# Patient Record
Sex: FEMALE | Race: OTHER | HISPANIC OR LATINO | URBAN - METROPOLITAN AREA
[De-identification: names, ages, dates, MRNs, and addresses within clinical notes are randomized per-mention and may not be internally consistent; named-entity substitution may affect disease eponyms.]

---

## 2018-02-12 ENCOUNTER — EMERGENCY (EMERGENCY)
Facility: HOSPITAL | Age: 38
LOS: 1 days | Discharge: HOME | End: 2018-02-12
Attending: EMERGENCY MEDICINE | Admitting: EMERGENCY MEDICINE
Payer: COMMERCIAL

## 2018-02-12 VITALS
RESPIRATION RATE: 18 BRPM | HEART RATE: 80 BPM | TEMPERATURE: 98 F | SYSTOLIC BLOOD PRESSURE: 117 MMHG | HEIGHT: 62 IN | WEIGHT: 126.1 LBS | OXYGEN SATURATION: 98 % | DIASTOLIC BLOOD PRESSURE: 71 MMHG

## 2018-02-12 PROCEDURE — 99283 EMERGENCY DEPT VISIT LOW MDM: CPT

## 2018-02-12 NOTE — ED STATDOCS - OBJECTIVE STATEMENT
37 year old female presenting to the ED complaining of left side chest pain with coughing, nasal congestion, and numbness and tingling down her left upper extremity x 1 week.  Pt states that she has a productive cough and HA, but denies having any fever. She states that she did not receive a flu vaccination this year. Pt states that she is a never smoker, and denies prior use of inhalers. No further complaints at this time.  PMD: Dr. Benietz 37 year old female presenting to the ED complaining of cough x 1 week: occasionally productive of phlegm.  Assoc with  left side chest pain with coughing, nasal congestion, headache, body aches, and numbness and tingling down her left upper extremity.  Denies having any fever. She states that she did not receive a flu vaccination this year. Pt states that she is a never smoker, and denies prior use of inhalers. No further complaints at this time.  PMD: Dr. Benitez

## 2018-02-12 NOTE — ED STATDOCS - ENMT, MLM
TM clear bilaterally. Moist mucous membranes. Normal oropharynx. Dry cough noted. Hoarse voice. Non-tender cervical adenopathy. TM clear bilaterally. Moist mucous membranes. Normal oropharynx. Hoarse voice. Non-tender cervical adenopathy.

## 2018-02-12 NOTE — ED ADULT TRIAGE NOTE - CHIEF COMPLAINT QUOTE
pt c/o cough , congestion and tingling now to arm , s/s started 1 week ago. pt denies fevers, pt reports pain with deep breath

## 2018-02-12 NOTE — ED STATDOCS - RESPIRATORY, MLM
breath sounds clear and equal bilaterally. No rales, no wheezes. breath sounds clear and equal bilaterally. No rales, no wheezes.  Dry cough noted.

## 2018-02-12 NOTE — ED STATDOCS - PLAN OF CARE
Tylenol extra strength 2 tablets every 4 hours or Ibuprofen (motrin or advil) 3 tablets (total 600mg) every 6 hours for aches, pains, fevers or chills.  Tessalon as prescribed OR Dimetapp DM 20ml bedfore bedtime for cough/ congestion relief. Keep well hydrated: drink lots of fluids, especially vitamin C (orange juice), tea with honey & lemon or chicken broth.  Return immediately to the ER for re-examination if your symptoms are worsening. Otherwise, follow-up with your doctor in 2-3 days for re-evaluation.

## 2018-02-12 NOTE — ED ADULT NURSE NOTE - OBJECTIVE STATEMENT
Patient arrived to ED today with c/o cough, pain to her left chest when she coughs, congestion, and at times she feels like her left arm is tingling for the past day. Patient arrived to ED today with c/o cough, pain to her left chest when she coughs, headache, congestion, and at times she feels like her left arm is tingling for the past day. Patient arrived to ED today with c/o cough, pain to her left chest when she coughs, headache, body aches, congestion, and at times she feels like her left arm is tingling for the past day.

## 2018-02-12 NOTE — ED STATDOCS - CARE PLAN
Principal Discharge DX:	Influenza-like illness  Assessment and plan of treatment:	Tylenol extra strength 2 tablets every 4 hours or Ibuprofen (motrin or advil) 3 tablets (total 600mg) every 6 hours for aches, pains, fevers or chills.  Tessalon as prescribed OR Dimetapp DM 20ml bedfore bedtime for cough/ congestion relief. Keep well hydrated: drink lots of fluids, especially vitamin C (orange juice), tea with honey & lemon or chicken broth.  Return immediately to the ER for re-examination if your symptoms are worsening. Otherwise, follow-up with your doctor in 2-3 days for re-evaluation.

## 2018-07-14 ENCOUNTER — EMERGENCY (EMERGENCY)
Facility: HOSPITAL | Age: 38
LOS: 1 days | Discharge: HOME | End: 2018-07-14
Attending: EMERGENCY MEDICINE
Payer: COMMERCIAL

## 2018-07-14 VITALS
OXYGEN SATURATION: 99 % | DIASTOLIC BLOOD PRESSURE: 81 MMHG | HEIGHT: 62 IN | TEMPERATURE: 99 F | HEART RATE: 72 BPM | RESPIRATION RATE: 20 BRPM | WEIGHT: 125 LBS | SYSTOLIC BLOOD PRESSURE: 122 MMHG

## 2018-07-14 PROCEDURE — 99283 EMERGENCY DEPT VISIT LOW MDM: CPT

## 2018-07-14 RX ORDER — METHOCARBAMOL 500 MG/1
1 TABLET, FILM COATED ORAL
Qty: 15 | Refills: 0 | OUTPATIENT
Start: 2018-07-14 | End: 2018-07-18

## 2018-07-14 RX ORDER — IBUPROFEN 200 MG
600 TABLET ORAL ONCE
Qty: 0 | Refills: 0 | Status: COMPLETED | OUTPATIENT
Start: 2018-07-14 | End: 2018-07-14

## 2018-07-14 RX ORDER — IBUPROFEN 200 MG
1 TABLET ORAL
Qty: 15 | Refills: 0 | OUTPATIENT
Start: 2018-07-14 | End: 2018-07-18

## 2018-07-14 RX ORDER — METHOCARBAMOL 500 MG/1
1500 TABLET, FILM COATED ORAL ONCE
Qty: 0 | Refills: 0 | Status: COMPLETED | OUTPATIENT
Start: 2018-07-14 | End: 2018-07-14

## 2018-07-14 RX ADMIN — METHOCARBAMOL 1500 MILLIGRAM(S): 500 TABLET, FILM COATED ORAL at 12:10

## 2018-07-14 RX ADMIN — Medication 600 MILLIGRAM(S): at 12:10

## 2018-07-14 NOTE — ED PEDIATRIC TRIAGE NOTE - CHIEF COMPLAINT QUOTE
Patient is awake and oriented times 3, arrives ambulatory from families home "I think the dog scratched his head with his teeth", per patients father the patients and dogs vccines are up to date
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
## 2018-07-14 NOTE — ED STATDOCS - PHYSICAL EXAMINATION
Musculoskeletal:  No midline tenderness of C-T-LS spine to palpation, pain and spasm to bilateral trapezius muscles,  left sided lumbar paraspinal muscle tenderness, ambulating without difficulty.
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
## 2018-10-20 ENCOUNTER — EMERGENCY (EMERGENCY)
Facility: HOSPITAL | Age: 38
LOS: 1 days | Discharge: HOME | End: 2018-10-20
Attending: EMERGENCY MEDICINE
Payer: MEDICAID

## 2018-10-20 VITALS
HEART RATE: 89 BPM | OXYGEN SATURATION: 100 % | SYSTOLIC BLOOD PRESSURE: 144 MMHG | TEMPERATURE: 98 F | DIASTOLIC BLOOD PRESSURE: 89 MMHG | WEIGHT: 130.07 LBS | RESPIRATION RATE: 89 BRPM

## 2018-10-20 VITALS
HEART RATE: 70 BPM | DIASTOLIC BLOOD PRESSURE: 75 MMHG | TEMPERATURE: 99 F | SYSTOLIC BLOOD PRESSURE: 110 MMHG | OXYGEN SATURATION: 97 % | RESPIRATION RATE: 20 BRPM

## 2018-10-20 LAB
ALBUMIN SERPL ELPH-MCNC: 4.2 G/DL — SIGNIFICANT CHANGE UP (ref 3.3–5)
ALP SERPL-CCNC: 40 U/L — SIGNIFICANT CHANGE UP (ref 40–120)
ALT FLD-CCNC: 24 U/L — SIGNIFICANT CHANGE UP (ref 12–78)
ANION GAP SERPL CALC-SCNC: 11 MMOL/L — SIGNIFICANT CHANGE UP (ref 5–17)
APTT BLD: 28.1 SEC — SIGNIFICANT CHANGE UP (ref 27.5–37.4)
AST SERPL-CCNC: 18 U/L — SIGNIFICANT CHANGE UP (ref 15–37)
BASOPHILS # BLD AUTO: 0.03 K/UL — SIGNIFICANT CHANGE UP (ref 0–0.2)
BASOPHILS NFR BLD AUTO: 0.4 % — SIGNIFICANT CHANGE UP (ref 0–2)
BILIRUB SERPL-MCNC: 0.4 MG/DL — SIGNIFICANT CHANGE UP (ref 0.2–1.2)
BUN SERPL-MCNC: 13 MG/DL — SIGNIFICANT CHANGE UP (ref 7–23)
CALCIUM SERPL-MCNC: 8.8 MG/DL — SIGNIFICANT CHANGE UP (ref 8.5–10.1)
CHLORIDE SERPL-SCNC: 108 MMOL/L — SIGNIFICANT CHANGE UP (ref 96–108)
CK MB CFR SERPL CALC: <1 NG/ML — SIGNIFICANT CHANGE UP (ref 0–3.6)
CO2 SERPL-SCNC: 21 MMOL/L — LOW (ref 22–31)
CREAT SERPL-MCNC: 0.69 MG/DL — SIGNIFICANT CHANGE UP (ref 0.5–1.3)
EOSINOPHIL # BLD AUTO: 0.08 K/UL — SIGNIFICANT CHANGE UP (ref 0–0.5)
EOSINOPHIL NFR BLD AUTO: 1 % — SIGNIFICANT CHANGE UP (ref 0–6)
GLUCOSE SERPL-MCNC: 70 MG/DL — SIGNIFICANT CHANGE UP (ref 70–99)
HCG SERPL-ACNC: <1 MIU/ML — SIGNIFICANT CHANGE UP
HCT VFR BLD CALC: 40.7 % — SIGNIFICANT CHANGE UP (ref 34.5–45)
HGB BLD-MCNC: 14.2 G/DL — SIGNIFICANT CHANGE UP (ref 11.5–15.5)
IMM GRANULOCYTES NFR BLD AUTO: 0.2 % — SIGNIFICANT CHANGE UP (ref 0–1.5)
INR BLD: 1.01 RATIO — SIGNIFICANT CHANGE UP (ref 0.88–1.16)
LIDOCAIN IGE QN: 130 U/L — SIGNIFICANT CHANGE UP (ref 73–393)
LYMPHOCYTES # BLD AUTO: 2.4 K/UL — SIGNIFICANT CHANGE UP (ref 1–3.3)
LYMPHOCYTES # BLD AUTO: 28.8 % — SIGNIFICANT CHANGE UP (ref 13–44)
MAGNESIUM SERPL-MCNC: 2.1 MG/DL — SIGNIFICANT CHANGE UP (ref 1.6–2.6)
MCHC RBC-ENTMCNC: 30.7 PG — SIGNIFICANT CHANGE UP (ref 27–34)
MCHC RBC-ENTMCNC: 34.9 GM/DL — SIGNIFICANT CHANGE UP (ref 32–36)
MCV RBC AUTO: 88.1 FL — SIGNIFICANT CHANGE UP (ref 80–100)
MONOCYTES # BLD AUTO: 0.56 K/UL — SIGNIFICANT CHANGE UP (ref 0–0.9)
MONOCYTES NFR BLD AUTO: 6.7 % — SIGNIFICANT CHANGE UP (ref 2–14)
NEUTROPHILS # BLD AUTO: 5.24 K/UL — SIGNIFICANT CHANGE UP (ref 1.8–7.4)
NEUTROPHILS NFR BLD AUTO: 62.9 % — SIGNIFICANT CHANGE UP (ref 43–77)
PLATELET # BLD AUTO: 343 K/UL — SIGNIFICANT CHANGE UP (ref 150–400)
POTASSIUM SERPL-MCNC: 3.8 MMOL/L — SIGNIFICANT CHANGE UP (ref 3.5–5.3)
POTASSIUM SERPL-SCNC: 3.8 MMOL/L — SIGNIFICANT CHANGE UP (ref 3.5–5.3)
PROT SERPL-MCNC: 8.6 G/DL — HIGH (ref 6–8.3)
PROTHROM AB SERPL-ACNC: 11 SEC — SIGNIFICANT CHANGE UP (ref 9.8–12.7)
RBC # BLD: 4.62 M/UL — SIGNIFICANT CHANGE UP (ref 3.8–5.2)
RBC # FLD: 12 % — SIGNIFICANT CHANGE UP (ref 10.3–14.5)
SODIUM SERPL-SCNC: 140 MMOL/L — SIGNIFICANT CHANGE UP (ref 135–145)
TROPONIN I SERPL-MCNC: <.015 NG/ML — SIGNIFICANT CHANGE UP (ref 0.01–0.04)
WBC # BLD: 8.33 K/UL — SIGNIFICANT CHANGE UP (ref 3.8–10.5)
WBC # FLD AUTO: 8.33 K/UL — SIGNIFICANT CHANGE UP (ref 3.8–10.5)

## 2018-10-20 PROCEDURE — 93010 ELECTROCARDIOGRAM REPORT: CPT

## 2018-10-20 PROCEDURE — 83735 ASSAY OF MAGNESIUM: CPT

## 2018-10-20 PROCEDURE — 83690 ASSAY OF LIPASE: CPT

## 2018-10-20 PROCEDURE — 93005 ELECTROCARDIOGRAM TRACING: CPT

## 2018-10-20 PROCEDURE — 71045 X-RAY EXAM CHEST 1 VIEW: CPT

## 2018-10-20 PROCEDURE — 36415 COLL VENOUS BLD VENIPUNCTURE: CPT

## 2018-10-20 PROCEDURE — 99291 CRITICAL CARE FIRST HOUR: CPT

## 2018-10-20 PROCEDURE — 73030 X-RAY EXAM OF SHOULDER: CPT | Mod: 26,LT

## 2018-10-20 PROCEDURE — 85027 COMPLETE CBC AUTOMATED: CPT

## 2018-10-20 PROCEDURE — 71045 X-RAY EXAM CHEST 1 VIEW: CPT | Mod: 26

## 2018-10-20 PROCEDURE — 73030 X-RAY EXAM OF SHOULDER: CPT

## 2018-10-20 PROCEDURE — 82553 CREATINE MB FRACTION: CPT

## 2018-10-20 PROCEDURE — 99284 EMERGENCY DEPT VISIT MOD MDM: CPT

## 2018-10-20 PROCEDURE — 70450 CT HEAD/BRAIN W/O DYE: CPT | Mod: 26

## 2018-10-20 PROCEDURE — 84484 ASSAY OF TROPONIN QUANT: CPT

## 2018-10-20 PROCEDURE — 82962 GLUCOSE BLOOD TEST: CPT

## 2018-10-20 PROCEDURE — 85610 PROTHROMBIN TIME: CPT

## 2018-10-20 PROCEDURE — 85730 THROMBOPLASTIN TIME PARTIAL: CPT

## 2018-10-20 PROCEDURE — 70450 CT HEAD/BRAIN W/O DYE: CPT

## 2018-10-20 PROCEDURE — 80053 COMPREHEN METABOLIC PANEL: CPT

## 2018-10-20 PROCEDURE — 84702 CHORIONIC GONADOTROPIN TEST: CPT
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
## 2018-10-20 NOTE — ED ADULT NURSE NOTE - OBJECTIVE STATEMENT
Assumed patient care @ 1800. Pt received sitting on stretcher and appears anxious. Pt AOx3 C/O being at work and having chest pain and left sided body numbness and pain. Patient at times lifting up left arm totally and other times saying she can't. Patient complains of total left sided numbness to arm and face. Lungs clear to ausculation, respirations even unlabored. Denies Nausea, Vomiting, Diarrhea. Skin warm, dry, color appropriate for age and race.
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
## 2018-10-20 NOTE — ED PROVIDER NOTE - OBJECTIVE STATEMENT
36 yo female no pmhx c/o left arm numbness/weakness since 4:30pm while at work, BIBEMS initially for shortness of breath/chest pain.  Continues to have chest pain.  Was noted by EMS to have BP in 200's systolic.

## 2018-10-20 NOTE — ED ADULT NURSE REASSESSMENT NOTE - NS ED NURSE REASSESS COMMENT FT1
Patient ambulated with steady gait. Dr. Angie rasheed.
Patient complaining of left shoulder pain and elbow pain and now states she can feel us touching her and she has decreased strength due to pain. Patient reports the pain prior to the numbness. Numbness, HA, and anxiety began after having an argument with her boss @ 1630.
Assumed care for pt awake laert and oriented x 3, [place]. Neuro intact. No signs of stroke noted at this time. Vss, afebrile. Pt is to be discharged. will continue to monitor

## 2018-10-20 NOTE — ED PROVIDER NOTE - CARE PLAN
Principal Discharge DX:	Left arm weakness Principal Discharge DX:	Left arm weakness  Secondary Diagnosis:	Acute pain of left shoulder

## 2020-05-14 ENCOUNTER — TRANSCRIPTION ENCOUNTER (OUTPATIENT)
Age: 40
End: 2020-05-14

## 2020-07-16 PROBLEM — M54.9 DORSALGIA, UNSPECIFIED: Chronic | Status: ACTIVE | Noted: 2018-10-20

## 2020-08-16 ENCOUNTER — TRANSCRIPTION ENCOUNTER (OUTPATIENT)
Age: 40
End: 2020-08-16

## 2020-08-28 PROBLEM — Z00.00 ENCOUNTER FOR PREVENTIVE HEALTH EXAMINATION: Status: ACTIVE | Noted: 2020-08-28

## 2020-08-31 ENCOUNTER — APPOINTMENT (OUTPATIENT)
Dept: RHEUMATOLOGY | Facility: CLINIC | Age: 40
End: 2020-08-31
Payer: MEDICAID

## 2020-08-31 ENCOUNTER — LABORATORY RESULT (OUTPATIENT)
Age: 40
End: 2020-08-31

## 2020-08-31 VITALS
RESPIRATION RATE: 17 BRPM | HEIGHT: 62.5 IN | DIASTOLIC BLOOD PRESSURE: 68 MMHG | OXYGEN SATURATION: 98 % | HEART RATE: 77 BPM | TEMPERATURE: 98.3 F | BODY MASS INDEX: 25.12 KG/M2 | WEIGHT: 140 LBS | SYSTOLIC BLOOD PRESSURE: 102 MMHG

## 2020-08-31 DIAGNOSIS — Z82.49 FAMILY HISTORY OF ISCHEMIC HEART DISEASE AND OTHER DISEASES OF THE CIRCULATORY SYSTEM: ICD-10-CM

## 2020-08-31 DIAGNOSIS — Z82.62 FAMILY HISTORY OF OSTEOPOROSIS: ICD-10-CM

## 2020-08-31 DIAGNOSIS — Z83.3 FAMILY HISTORY OF DIABETES MELLITUS: ICD-10-CM

## 2020-08-31 DIAGNOSIS — Z86.39 PERSONAL HISTORY OF OTHER ENDOCRINE, NUTRITIONAL AND METABOLIC DISEASE: ICD-10-CM

## 2020-08-31 DIAGNOSIS — Z78.9 OTHER SPECIFIED HEALTH STATUS: ICD-10-CM

## 2020-08-31 PROCEDURE — 99203 OFFICE O/P NEW LOW 30 MIN: CPT

## 2020-08-31 RX ORDER — HYDROCODONE BITARTRATE AND ACETAMINOPHEN 7.5; 3 MG/1; MG/1
TABLET ORAL
Refills: 0 | Status: ACTIVE | COMMUNITY

## 2020-08-31 NOTE — HISTORY OF PRESENT ILLNESS
[FreeTextEntry1] : 39 year old female with PMH as listed below presents today for an initial evaluation. \par \par Reports to have chronic hx of low back pain, pain/swelling to Right 5th DIP that is getting progressively worse. Pain is constant, 4/10- dull/aching, worse with activity and at the end of the day. Currently taking hydrocodone prn with improvement in symptoms. \par \par Denies pain/swelling to other joints. Denies morning stiffness. denies inflammatory back pain. denies radicular symptoms. denies enthesitis, uveitis, dactylitis, psoriasis/rashes.\par \par Recently had imaging at [facility]- results pending. \par \par denies fever, chills, weight loss, weight gain, fatigue, malaise, denies dry eye,eye pain, eye redness, vision changes, dry mouth, oral ulcers, nasal congestion, difficulty swallowing,  denies ear pain, hearing changes, denies chest pain, chest palpitations, denies sob, denies nausea, vomiting, abdominal pain, diarrhea, constipation, blood in stool, denies dysuria, blood in the urine, denies rashes, photosensitivity, hair loss, skin thickening, denies blood clots,  raynauds, denies weakness, numbness, syncope, falls, headaches, denies depression, anxiety, denies bruising easily

## 2020-08-31 NOTE — PHYSICAL EXAM
[General Appearance - Alert] : alert [General Appearance - In No Acute Distress] : in no acute distress [General Appearance - Well Nourished] : well nourished [General Appearance - Well Developed] : well developed [Sclera] : the sclera and conjunctiva were normal [PERRL With Normal Accommodation] : pupils were equal in size, round, and reactive to light [Extraocular Movements] : extraocular movements were intact [Examination Of The Oral Cavity] : the lips and gums were normal [Outer Ear] : the ears and nose were normal in appearance [Neck Appearance] : the appearance of the neck was normal [Oropharynx] : the oropharynx was normal [Jugular Venous Distention Increased] : there was no jugular-venous distention [Auscultation Breath Sounds / Voice Sounds] : lungs were clear to auscultation bilaterally [Heart Rate And Rhythm] : heart rate was normal and rhythm regular [Heart Sounds Gallop] : no gallops [Heart Sounds] : normal S1 and S2 [Heart Sounds Pericardial Friction Rub] : no pericardial rub [Murmurs] : no murmurs [Bowel Sounds] : normal bowel sounds [Abdomen Soft] : soft [Abdomen Tenderness] : non-tender [No CVA Tenderness] : no ~M costovertebral angle tenderness [No Spinal Tenderness] : no spinal tenderness [Abnormal Walk] : normal gait [Nail Clubbing] : no clubbing  or cyanosis of the fingernails [Involuntary Movements] : no involuntary movements were seen [Musculoskeletal - Swelling] : no joint swelling seen [Motor Tone] : muscle strength and tone were normal [Skin Color & Pigmentation] : normal skin color and pigmentation [Skin Turgor] : normal skin turgor [] : no rash [Skin Lesions] : no skin lesions [Sensation] : the sensory exam was normal to light touch and pinprick [Motor Exam] : the motor exam was normal [No Focal Deficits] : no focal deficits [Oriented To Time, Place, And Person] : oriented to person, place, and time [Impaired Insight] : insight and judgment were intact [Affect] : the affect was normal [Mood] : the mood was normal [FreeTextEntry1] : +R>Left Heberden nodes

## 2020-08-31 NOTE — ASSESSMENT
[FreeTextEntry1] : 39 year old female presents today for an initial evaluation. Reports to have chronic hx of low back pain, pain/swelling to Right 5th DIP that is getting progressively worse. Currently taking hydrocodone prn with improvement in symptoms. PE with Heberden nodes. \par \par Symptoms likely from degenerative disease/OA\par \par -labs as below\par -will request and review recent imaging\par -NSAIDS/Tylenol prn for pain (reviewed risk and benefits of medications)\par -OTC topical analgesics\par \par Discussed treatment plan with the patient. The patient was given the opportunity to ask questions and all questions were answered to their satisfaction.\par

## 2020-09-14 ENCOUNTER — APPOINTMENT (OUTPATIENT)
Dept: RHEUMATOLOGY | Facility: CLINIC | Age: 40
End: 2020-09-14
Payer: MEDICAID

## 2020-09-14 VITALS — TEMPERATURE: 97.8 F

## 2020-09-14 DIAGNOSIS — M79.641 PAIN IN RIGHT HAND: ICD-10-CM

## 2020-09-14 DIAGNOSIS — G89.29 PAIN IN RIGHT HAND: ICD-10-CM

## 2020-09-14 DIAGNOSIS — M54.5 LOW BACK PAIN: ICD-10-CM

## 2020-09-14 DIAGNOSIS — M25.50 PAIN IN UNSPECIFIED JOINT: ICD-10-CM

## 2020-09-14 DIAGNOSIS — G89.29 LOW BACK PAIN: ICD-10-CM

## 2020-09-14 PROCEDURE — 99213 OFFICE O/P EST LOW 20 MIN: CPT

## 2020-09-14 RX ORDER — DULOXETINE HYDROCHLORIDE 20 MG/1
20 CAPSULE, DELAYED RELEASE PELLETS ORAL
Qty: 30 | Refills: 2 | Status: ACTIVE | COMMUNITY
Start: 2020-09-14 | End: 1900-01-01

## 2020-09-14 NOTE — PHYSICAL EXAM
[General Appearance - Alert] : alert [General Appearance - In No Acute Distress] : in no acute distress [General Appearance - Well Nourished] : well nourished [General Appearance - Well Developed] : well developed [Sclera] : the sclera and conjunctiva were normal [PERRL With Normal Accommodation] : pupils were equal in size, round, and reactive to light [Extraocular Movements] : extraocular movements were intact [Outer Ear] : the ears and nose were normal in appearance [Examination Of The Oral Cavity] : the lips and gums were normal [Oropharynx] : the oropharynx was normal [Neck Appearance] : the appearance of the neck was normal [Jugular Venous Distention Increased] : there was no jugular-venous distention [Auscultation Breath Sounds / Voice Sounds] : lungs were clear to auscultation bilaterally [Heart Rate And Rhythm] : heart rate was normal and rhythm regular [Heart Sounds Gallop] : no gallops [Heart Sounds] : normal S1 and S2 [Murmurs] : no murmurs [Heart Sounds Pericardial Friction Rub] : no pericardial rub [Bowel Sounds] : normal bowel sounds [Abdomen Tenderness] : non-tender [Abdomen Soft] : soft [No CVA Tenderness] : no ~M costovertebral angle tenderness [No Spinal Tenderness] : no spinal tenderness [Abnormal Walk] : normal gait [Nail Clubbing] : no clubbing  or cyanosis of the fingernails [Involuntary Movements] : no involuntary movements were seen [Musculoskeletal - Swelling] : no joint swelling seen [Motor Tone] : muscle strength and tone were normal [Skin Color & Pigmentation] : normal skin color and pigmentation [Skin Turgor] : normal skin turgor [Skin Lesions] : no skin lesions [] : no rash [Sensation] : the sensory exam was normal to light touch and pinprick [Motor Exam] : the motor exam was normal [No Focal Deficits] : no focal deficits [Oriented To Time, Place, And Person] : oriented to person, place, and time [Impaired Insight] : insight and judgment were intact [Affect] : the affect was normal [Mood] : the mood was normal [FreeTextEntry1] : +R>Left Heberden nodes

## 2020-09-14 NOTE — HISTORY OF PRESENT ILLNESS
[FreeTextEntry1] : Pt presenting today for a f.u visit. \par Continues to have low back pain, pain/swelling to Right 5th DIP. Currently taking hydrocodone prn with some improvement in symptoms. \par Denies pain/swelling to other joints. Denies morning stiffness. denies inflammatory back pain. denies radicular symptoms. denies enthesitis, uveitis, dactylitis, psoriasis/rashes.\par Labs reviewed with pt.

## 2020-09-14 NOTE — ASSESSMENT
[FreeTextEntry1] : 39 year old female presents today for an f.u visit. Reports to have chronic hx of low back pain, pain/swelling to Right 5th DIP that is getting progressively worse. Currently taking hydrocodone prn with some improvement in symptoms. PE with Heberden nodes. Labs unremarkable. \par \par Symptoms likely from degenerative disease/OA\par \par -start duloxetine 20 mg daily (reviewed risk and benefits of medications)\par -NSAIDS/Tylenol prn for pain\par -OTC topical analgesics\par \par Discussed treatment plan with the patient. The patient was given the opportunity to ask questions and all questions were answered to their satisfaction.

## 2020-11-03 NOTE — ED PROVIDER NOTE - PMH
Back pain Assumed pt care at 1645.  Pt reports he woke up with left sided chest pain.  It came back later this afternoon.  Pt has been nauseated X a couple of days and gets hiccups when he bends over. Had diarrhea a week ago and still has some lower abdominal pain.  Abdomen flat, nondistended.

## 2021-01-05 ENCOUNTER — APPOINTMENT (OUTPATIENT)
Dept: RHEUMATOLOGY | Facility: CLINIC | Age: 41
End: 2021-01-05

## 2021-01-06 ENCOUNTER — APPOINTMENT (OUTPATIENT)
Dept: ORTHOPEDIC SURGERY | Facility: CLINIC | Age: 41
End: 2021-01-06
Payer: COMMERCIAL

## 2021-01-06 PROCEDURE — 99072 ADDL SUPL MATRL&STAF TM PHE: CPT

## 2021-01-06 PROCEDURE — 73030 X-RAY EXAM OF SHOULDER: CPT | Mod: RT

## 2021-01-06 PROCEDURE — 99203 OFFICE O/P NEW LOW 30 MIN: CPT

## 2021-01-06 RX ORDER — METHYLPREDNISOLONE 4 MG/1
4 TABLET ORAL
Qty: 1 | Refills: 0 | Status: ACTIVE | COMMUNITY
Start: 2021-01-06 | End: 1900-01-01

## 2021-01-06 RX ORDER — NAPROXEN 500 MG/1
500 TABLET ORAL
Qty: 28 | Refills: 0 | Status: ACTIVE | COMMUNITY
Start: 2021-01-06 | End: 1900-01-01

## 2021-01-06 NOTE — ADDENDUM
[FreeTextEntry1] : IYolanda ATC, assisted with the history and documentation for Dr. Byrnes on this date 01/06/2021\par

## 2021-01-06 NOTE — DISCUSSION/SUMMARY
[de-identified] : Assessment: 40-year-old female with right shoulder pain secondary to calcific tendinitis\par \par Plan: I had a long discussion with the patient today regarding the nature of their diagnosis and treatment plan. We discussed the risks and benefits of no treatment as well as nonoperative and operative treatments.  I reviewed the patient's x-rays today with her in the office which are significant for calcific tendinitis.  At this time I recommend conservative treatment of the patient's condition with modalities including rest, ice, heat, anti-inflammatory medications, activity modifications, and home stretching and strengthening exercises daily.  Prescription for naproxen as well as Medrol Dosepak were sent to the patient's pharmacy today.  I discussed with the patient the risks and benefits associated with NSAID use.  A referral for physical therapy was required today to begin working on shoulder exercises.  She deferred a cortisone injection at this time.  A note was provided today to begin light duty for the next 2 weeks at her job at the [employer].  Recommend follow-up in 8 weeks for repeat clinical evaluation.\par \par The patient verbalizes her understanding and agrees with the plan.  All questions were answered to their satisfaction.

## 2021-01-06 NOTE — PHYSICAL EXAM
[de-identified] : General:\par Awake, alert, no acute distress, Patient was cooperative and appropriate during the examination.\par \par The patient is of normal weight for height and age.\par \par Walks without an antalgic gait.\par \par Full, painless range of motion of the neck and back.\par \par Exam of the bilateral lower extremities is intact and symmetric with regards to dermatologic, vascular, and neurologic exam. Bilateral lower extremity sensation is grossly intact to light touch in the DP/SP/T/S/S nerve distributions. Intact DF/PF/EHL. BIlateral lower extremities warm and well-perfused with brisk capillary refill.\par \par \par Pulmonary:\par Regular, nonlabored breathing\par \par Abdomen:\par Soft, nontender, nondistended.\par \par Lymphatic:\par No evidence of inguinal lymphadenopathy\par \par Right shoulder Exam:\par Physical exam of the shoulder demonstrates normal skin without signs of skin changes or abnormalities. No erythema, warmth, or joint effusion appreciated.  \par \par Sensation intact light touch C5-T1\par Palpable radial pulse\par Radial/ulnar/median/axillary/musculocutaneous/AIN/PIN nerves grossly intact\par \par Range of motion:\par Forward Flexion: 170\par Abduction: 150\par External Rotation: 45\par Internal Rotation: T12\par \par Palpation:\par Not tender to palpation over the glenohumeral joint\par Exquisitely tender palpation over the rotator cuff insertion on the greater tuberosity\par Not tender to palpation over the AC joint\par Moderately tender to palpation of the biceps tendon/bicipital groove\par \par Strength testing:\par Supraspinatus: 4+/5 by pain\par Infraspinatus: 4+/5 motor by pain\par Subscapularis: 5/5\par \par Special test:\par Empty can test positive\par Cleaning impingement test positive\par Speeds test negative\par [place]'s test negative\par Lift-off test negative\par Bell-press test negative\par Cross-arm adduction test negative\par \par  [de-identified] : X-rays including 4 views of the right shoulder obtained in the office and reviewed the patient today.  There is no acute fracture or dislocation.  There is no arthritis.  There is evidence of calcific tendinitis.

## 2021-01-06 NOTE — HISTORY OF PRESENT ILLNESS
[Worsening] : worsening [6] : an average pain level of 6/10 [5] : a minimum pain level of 5/10 [10] : a maximum pain level of 10/10 [Constant] : ~He/She~ states the symptoms seem to be constant [Rest] : relieved by rest [de-identified] : Allison is a 40 year old RHD Female post  who presents with right shoulder pain. Pain is located at the glenohumeral joint.  It began on 1/4/2020, without injury or trauma. She describes her pain as stabbing, in nature, 5/10 in intensity, worse with arm movement, especially GH abduction, better with rest, and Hydrocodone. 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 She denies any bruising, or swelling, nor does she complain of any painful popping, clicking, but she does complain of numbness in her lateral arm down into her hand.  The patient denies any fevers, chills, sweats, recent illnesses, numbness, tingling, weakness, or pain elsewhere at this time.

## 2021-01-27 ENCOUNTER — APPOINTMENT (OUTPATIENT)
Dept: ORTHOPEDIC SURGERY | Facility: CLINIC | Age: 41
End: 2021-01-27

## 2021-01-28 ENCOUNTER — APPOINTMENT (OUTPATIENT)
Dept: ORTHOPEDIC SURGERY | Facility: CLINIC | Age: 41
End: 2021-01-28

## 2021-02-01 ENCOUNTER — APPOINTMENT (OUTPATIENT)
Dept: ORTHOPEDIC SURGERY | Facility: CLINIC | Age: 41
End: 2021-02-01

## 2021-02-08 ENCOUNTER — APPOINTMENT (OUTPATIENT)
Dept: ORTHOPEDIC SURGERY | Facility: CLINIC | Age: 41
End: 2021-02-08
Payer: COMMERCIAL

## 2021-02-08 PROCEDURE — 99213 OFFICE O/P EST LOW 20 MIN: CPT

## 2021-02-08 PROCEDURE — 99072 ADDL SUPL MATRL&STAF TM PHE: CPT

## 2021-02-23 ENCOUNTER — OUTPATIENT (OUTPATIENT)
Dept: OUTPATIENT SERVICES | Facility: HOSPITAL | Age: 41
LOS: 1 days | End: 2021-02-23

## 2021-02-23 ENCOUNTER — APPOINTMENT (OUTPATIENT)
Dept: ULTRASOUND IMAGING | Facility: CLINIC | Age: 41
End: 2021-02-23
Payer: COMMERCIAL

## 2021-02-23 ENCOUNTER — RESULT REVIEW (OUTPATIENT)
Age: 41
End: 2021-02-23

## 2021-02-23 DIAGNOSIS — M25.511 PAIN IN RIGHT SHOULDER: ICD-10-CM

## 2021-02-23 PROCEDURE — 20611 DRAIN/INJ JOINT/BURSA W/US: CPT | Mod: RT

## 2021-03-03 ENCOUNTER — APPOINTMENT (OUTPATIENT)
Dept: ORTHOPEDIC SURGERY | Facility: CLINIC | Age: 41
End: 2021-03-03
Payer: COMMERCIAL

## 2021-03-08 ENCOUNTER — APPOINTMENT (OUTPATIENT)
Dept: ORTHOPEDIC SURGERY | Facility: CLINIC | Age: 41
End: 2021-03-08
Payer: COMMERCIAL

## 2021-03-08 VITALS
DIASTOLIC BLOOD PRESSURE: 84 MMHG | BODY MASS INDEX: 25.12 KG/M2 | HEIGHT: 62.5 IN | HEART RATE: 69 BPM | WEIGHT: 140 LBS | SYSTOLIC BLOOD PRESSURE: 133 MMHG

## 2021-03-08 VITALS — TEMPERATURE: 97.8 F

## 2021-03-08 PROCEDURE — 99072 ADDL SUPL MATRL&STAF TM PHE: CPT

## 2021-03-08 PROCEDURE — 99213 OFFICE O/P EST LOW 20 MIN: CPT

## 2021-05-03 ENCOUNTER — APPOINTMENT (OUTPATIENT)
Dept: ORTHOPEDIC SURGERY | Facility: CLINIC | Age: 41
End: 2021-05-03

## 2021-05-20 ENCOUNTER — APPOINTMENT (OUTPATIENT)
Dept: ORTHOPEDIC SURGERY | Facility: CLINIC | Age: 41
End: 2021-05-20
Payer: COMMERCIAL

## 2021-05-20 VITALS
SYSTOLIC BLOOD PRESSURE: 137 MMHG | WEIGHT: 140 LBS | HEART RATE: 70 BPM | DIASTOLIC BLOOD PRESSURE: 81 MMHG | HEIGHT: 62.5 IN | BODY MASS INDEX: 25.12 KG/M2

## 2021-05-20 PROCEDURE — 73030 X-RAY EXAM OF SHOULDER: CPT | Mod: RT

## 2021-05-20 PROCEDURE — 99072 ADDL SUPL MATRL&STAF TM PHE: CPT

## 2021-05-20 PROCEDURE — 99214 OFFICE O/P EST MOD 30 MIN: CPT

## 2021-05-20 RX ORDER — MELOXICAM 15 MG/1
15 TABLET ORAL
Qty: 21 | Refills: 0 | Status: ACTIVE | COMMUNITY
Start: 2021-05-20 | End: 1900-01-01

## 2021-07-12 ENCOUNTER — APPOINTMENT (OUTPATIENT)
Dept: ORTHOPEDIC SURGERY | Facility: CLINIC | Age: 41
End: 2021-07-12

## 2021-07-27 ENCOUNTER — OUTPATIENT (OUTPATIENT)
Dept: OUTPATIENT SERVICES | Facility: HOSPITAL | Age: 41
LOS: 1 days | End: 2021-07-27

## 2021-07-27 ENCOUNTER — APPOINTMENT (OUTPATIENT)
Dept: MRI IMAGING | Facility: CLINIC | Age: 41
End: 2021-07-27
Payer: COMMERCIAL

## 2021-07-27 DIAGNOSIS — M25.511 PAIN IN RIGHT SHOULDER: ICD-10-CM

## 2021-07-27 PROCEDURE — 73221 MRI JOINT UPR EXTREM W/O DYE: CPT | Mod: 26,RT

## 2021-08-05 ENCOUNTER — APPOINTMENT (OUTPATIENT)
Dept: ORTHOPEDIC SURGERY | Facility: CLINIC | Age: 41
End: 2021-08-05
Payer: COMMERCIAL

## 2021-08-05 PROCEDURE — 99213 OFFICE O/P EST LOW 20 MIN: CPT

## 2021-08-05 RX ORDER — MELOXICAM 15 MG/1
15 TABLET ORAL
Qty: 21 | Refills: 0 | Status: ACTIVE | COMMUNITY
Start: 2021-08-05 | End: 1900-01-01

## 2021-08-05 NOTE — PHYSICAL EXAM
[de-identified] : General:\par Awake, alert, no acute distress, Patient was cooperative and appropriate during the examination.\par \par The patient is of normal weight for height and age.\par \par Walks without an antalgic gait.\par \par Full, painless range of motion of the neck and back.\par \par Exam of the bilateral lower extremities is intact and symmetric with regards to dermatologic, vascular, and neurologic exam. Bilateral lower extremity sensation is grossly intact to light touch in the DP/SP/T/S/S nerve distributions. Intact DF/PF/EHL. BIlateral lower extremities warm and well-perfused with brisk capillary refill.\par \par \par Pulmonary:\par Regular, nonlabored breathing\par \par Abdomen:\par Soft, nontender, nondistended.\par \par Lymphatic:\par No evidence of axillary lymphadenopathy\par \par Right shoulder Exam:\par Physical exam of the shoulder demonstrates normal skin without signs of skin changes or abnormalities. No erythema, warmth, or joint effusion appreciated.  \par \par Sensation intact light touch C5-T1\par Palpable radial pulse\par Radial/ulnar/median/axillary/musculocutaneous/AIN/PIN nerves grossly intact\par \par Range of motion:\par Forward Flexion: 175\par Abduction: 150\par External Rotation: 45\par Internal Rotation: T12\par \par Palpation:\par Not tender to palpation over the glenohumeral joint\par Not tender palpation over the rotator cuff insertion on the greater tuberosity\par Not tender to palpation over the AC joint\par Moderately tender to palpation of the biceps tendon/bicipital groove\par \par Strength testing:\par Supraspinatus: 5/5\par Infraspinatus: 5/5\par Subscapularis: 5/5\par \par Special test:\par Empty can test negative\par Cleaning impingement test mildly positive\par Speeds test positive\par [place]'s test negative\par Lift-off test negative\par Bell-press test negative\par Cross-arm adduction test negative\par \par  [de-identified] : MRI of the right shoulder from a [facility] imaging facility performed on 7/29/2021 was reviewed the patient today in the office.  There is no rotator cuff tear or other rotator cuff abnormality.  No focal pathology is identified in the right shoulder.

## 2021-08-05 NOTE — HISTORY OF PRESENT ILLNESS
[de-identified] : Allison is a pleasant 40-year-old right-hand-dominant female returns the office today for follow-up regarding her right shoulder pain secondary to calcific tendinitis.  Patient states her symptoms are unchanged since her previous visit and she continues to have pain in the anterior shoulder which wakes her up from sleep at night.  She recently had an MRI and comes in to discuss those results with me today and any further recommendations.  The patient denies any fevers, chills, sweats, recent illnesses, numbness, tingling, weakness, or pain elsewhere at this time.

## 2021-08-05 NOTE — DISCUSSION/SUMMARY
[de-identified] : Assessment: 40-year-old female with right shoulder pain secondary to biceps tendinitis\par \par Plan: I had a long discussion with the patient today regarding the nature of their diagnosis and treatment plan. We discussed the risks and benefits of no treatment as well as nonoperative and operative treatments.  I reviewed the patient's MRI today with her in the office which is negative for any rotator cuff tearing and does not demonstrate any identifiable residual calcium deposits.  On examination today most of her pain is shifted anteriorly.  She has pain over the bicipital groove with a positive speeds test indicative of biceps tendinopathy.  The patient would prefer to continue with conservative treatments with modalities including resting, icing, heating with stretching of anti-inflammatory medicines as needed, daily modifications, and home exercises.  A new prescription for meloxicam was sent to the patient's pharmacy today.  I reviewed the risks and benefits associated NSAID use.  She was also provided with a referral for an ultrasound-guided biceps tendon sheath cortisone injection.  Recommend follow-up 2 weeks after the injection for repeat clinical evaluation.  The patient is feeling better at that time we we will clear her to return to work without any restrictions.\par \par The patient verbalizes their understanding and agrees with the plan.  All questions were answered to their satisfaction.

## 2021-08-07 ENCOUNTER — RESULT REVIEW (OUTPATIENT)
Age: 41
End: 2021-08-07

## 2021-08-31 ENCOUNTER — APPOINTMENT (OUTPATIENT)
Dept: DERMATOLOGY | Facility: CLINIC | Age: 41
End: 2021-08-31
Payer: COMMERCIAL

## 2021-08-31 DIAGNOSIS — L30.9 DERMATITIS, UNSPECIFIED: ICD-10-CM

## 2021-08-31 DIAGNOSIS — L21.9 SEBORRHEIC DERMATITIS, UNSPECIFIED: ICD-10-CM

## 2021-08-31 PROCEDURE — 99204 OFFICE O/P NEW MOD 45 MIN: CPT

## 2021-08-31 RX ORDER — KETOCONAZOLE 20.5 MG/ML
2 SHAMPOO, SUSPENSION TOPICAL
Qty: 1 | Refills: 11 | Status: ACTIVE | COMMUNITY
Start: 2021-08-31 | End: 1900-01-01

## 2021-08-31 RX ORDER — KETOCONAZOLE 20 MG/G
2 CREAM TOPICAL
Qty: 1 | Refills: 2 | Status: ACTIVE | COMMUNITY
Start: 2021-08-31 | End: 1900-01-01

## 2021-08-31 RX ORDER — TRIAMCINOLONE ACETONIDE 1 MG/G
0.1 OINTMENT TOPICAL
Qty: 1 | Refills: 0 | Status: ACTIVE | COMMUNITY
Start: 2021-08-31 | End: 1900-01-01

## 2021-09-13 ENCOUNTER — OUTPATIENT (OUTPATIENT)
Dept: OUTPATIENT SERVICES | Facility: HOSPITAL | Age: 41
LOS: 1 days | End: 2021-09-13

## 2021-09-13 ENCOUNTER — APPOINTMENT (OUTPATIENT)
Dept: ULTRASOUND IMAGING | Facility: CLINIC | Age: 41
End: 2021-09-13
Payer: COMMERCIAL

## 2021-09-13 DIAGNOSIS — M25.511 PAIN IN RIGHT SHOULDER: ICD-10-CM

## 2021-09-13 PROCEDURE — 20611 DRAIN/INJ JOINT/BURSA W/US: CPT | Mod: RT

## 2021-09-17 ENCOUNTER — APPOINTMENT (OUTPATIENT)
Dept: ORTHOPEDIC SURGERY | Facility: CLINIC | Age: 41
End: 2021-09-17
Payer: COMMERCIAL

## 2022-01-07 ENCOUNTER — APPOINTMENT (OUTPATIENT)
Dept: ORTHOPEDIC SURGERY | Facility: CLINIC | Age: 42
End: 2022-01-07
Payer: COMMERCIAL

## 2022-01-07 VITALS
BODY MASS INDEX: 25.12 KG/M2 | HEART RATE: 77 BPM | DIASTOLIC BLOOD PRESSURE: 84 MMHG | SYSTOLIC BLOOD PRESSURE: 135 MMHG | WEIGHT: 140 LBS | HEIGHT: 62.5 IN

## 2022-01-07 PROCEDURE — 99213 OFFICE O/P EST LOW 20 MIN: CPT

## 2022-01-07 RX ORDER — MELOXICAM 15 MG/1
15 TABLET ORAL
Qty: 30 | Refills: 0 | Status: ACTIVE | COMMUNITY
Start: 2022-01-07 | End: 1900-01-01

## 2022-02-03 ENCOUNTER — APPOINTMENT (OUTPATIENT)
Dept: DERMATOLOGY | Facility: CLINIC | Age: 42
End: 2022-02-03

## 2022-05-12 ENCOUNTER — APPOINTMENT (OUTPATIENT)
Dept: ORTHOPEDIC SURGERY | Facility: CLINIC | Age: 42
End: 2022-05-12
Payer: COMMERCIAL

## 2022-05-12 VITALS
DIASTOLIC BLOOD PRESSURE: 92 MMHG | HEART RATE: 66 BPM | BODY MASS INDEX: 25.12 KG/M2 | HEIGHT: 62.5 IN | WEIGHT: 140 LBS | SYSTOLIC BLOOD PRESSURE: 140 MMHG

## 2022-05-12 PROCEDURE — 99213 OFFICE O/P EST LOW 20 MIN: CPT

## 2022-05-12 RX ORDER — MELOXICAM 15 MG/1
15 TABLET ORAL
Qty: 21 | Refills: 0 | Status: ACTIVE | COMMUNITY
Start: 2022-05-12 | End: 1900-01-01

## 2022-05-12 NOTE — HISTORY OF PRESENT ILLNESS
[de-identified] : 05/12/2022 : ANETA ANSARI  is a 41 year year old female who presents to the office for follow-up evaluation of her right shoulder pain.  She states she gets pain mostly over the anterior aspect of the right shoulder that is worse certain activities and movements and when sleeping and alleviated with rest.  She states she has taken meloxicam, done physical therapy and had an injection for the biceps and the calcium deposit of her shoulder all which have given short-term temporary relief.  She states she has been doing this for months without any significant long-term relief.  She is here for routine follow-up today.  She denies any numbness or tingling distally.  She has no other complaints today.\par \par 01/07/2022 : ANETA ANSARI  is a 41 year year old female who presents to the office for evaluation of her right shoulder.  She states she received an ultrasound-guided injection into the biceps back in September that gave relief for a couple months.  She states the procedure was uncomfortable however the injection did give her relief for a couple months.  She states however she still has pain over the anterior aspect of the shoulder that is worse with certain movements and alleviated with rest.  She states it can be sharp at times and radiates down the arm.  She states he is unable to lift heavy things because of the pain in the anterior aspect of the shoulder.  She states she has not done physical therapy because of lack of approval from her insurance company.  She denies any acute injury.  She denies any numbness or tingling distally.  She has no other complaints today.  She is been working light duty.\par \par 8/5/21: Antea is a pleasant 40-year-old right-hand-dominant female returns the office today for follow-up regarding her right shoulder pain secondary to calcific tendinitis.  Patient states her symptoms are unchanged since her previous visit and she continues to have pain in the anterior shoulder which wakes her up from sleep at night.  She recently had an MRI and comes in to discuss those results with me today and any further recommendations.  The patient denies any fevers, chills, sweats, recent illnesses, numbness, tingling, weakness, or pain elsewhere at this time.

## 2022-05-12 NOTE — DISCUSSION/SUMMARY
[de-identified] : Assessment: 41-year-old female with right shoulder pain secondary to biceps tendinitis\par \par Plan: I had a long discussion with the patient today regarding the nature of their diagnosis and treatment plan. We discussed the risks and benefits of no treatment as well as nonoperative and operative treatments.  I reviewed the patient's MRI today with her in the office which is negative for any rotator cuff tearing and does not demonstrate any identifiable residual calcium deposits.  On examination today most of her pain is shifted anteriorly.  She has pain over the bicipital groove with a positive speeds test indicative of biceps tendinopathy.  Due to the lack of formal physical therapy I am recommending physical therapy, Mobic 15 mg once daily to be taken with food for pain and inflammation as this has given relief in the past.  A prescription was sent to the pharmacy.  I emphasized the importance of physical therapy both formally and on her own for pain and inflammation.  Due to the chronicity of her symptoms and lack of improvement with conservative treatment I am also recommending an MRI of the right shoulder for potential presurgical planning pending the MRI results.  She will follow-up with me to discuss the results afterwards.  We may discuss potential tenodesis procedure pending the MRI results.  Patient discussed and reviewed with Dr. Byrnes today.\par \par The patient verbalizes their understanding and agrees with the plan.  All questions were answered to their satisfaction.

## 2022-05-12 NOTE — PHYSICAL EXAM
[de-identified] : General:\par Awake, alert, no acute distress, Patient was cooperative and appropriate during the examination.\par \par The patient is of normal weight for height and age.\par \par Walks without an antalgic gait.\par \par Full, painless range of motion of the neck and back.\par \par Exam of the bilateral lower extremities is intact and symmetric with regards to dermatologic, vascular, and neurologic exam. Bilateral lower extremity sensation is grossly intact to light touch in the DP/SP/T/S/S nerve distributions. Intact DF/PF/EHL. BIlateral lower extremities warm and well-perfused with brisk capillary refill.\par \par \par Pulmonary:\par Regular, nonlabored breathing\par \par Abdomen:\par Soft, nontender, nondistended.\par \par Lymphatic:\par No evidence of axillary lymphadenopathy\par \par Right shoulder Exam:\par Physical exam of the shoulder demonstrates normal skin without signs of skin changes or abnormalities. No erythema, warmth, or joint effusion appreciated.  \par \par Sensation intact light touch C5-T1\par Palpable radial pulse\par Radial/ulnar/median/axillary/musculocutaneous/AIN/PIN nerves grossly intact\par \par Range of motion:\par Forward Flexion: 175\par Abduction: 150\par External Rotation: 45\par Internal Rotation: T12\par \par Palpation:\par Not tender to palpation over the glenohumeral joint\par Mildly tender palpation over the rotator cuff insertion on the greater tuberosity\par Not tender to palpation over the AC joint\par Moderately tender to palpation of the biceps tendon/bicipital groove\par \par Strength testing:\par Supraspinatus: 5/5\par Infraspinatus: 5/5\par Subscapularis: 5/5\par \par Special test:\par Empty can test mildly positive\par Cleaning impingement test mildly positive\par Speeds test positive\par Cole's test negative\par Lift-off test negative\par Bell-press test negative\par Cross-arm adduction test negative\par \par  [de-identified] : MRI of the right shoulder from a [facility] imaging facility performed on 7/29/2021 was reviewed the patient today in the office.  There is no rotator cuff tear or other rotator cuff abnormality.  No focal pathology is identified in the right shoulder.

## 2022-12-14 ENCOUNTER — APPOINTMENT (OUTPATIENT)
Dept: ORTHOPEDIC SURGERY | Facility: CLINIC | Age: 42
End: 2022-12-14

## 2022-12-14 VITALS
WEIGHT: 140 LBS | BODY MASS INDEX: 25.12 KG/M2 | SYSTOLIC BLOOD PRESSURE: 139 MMHG | HEIGHT: 62.5 IN | HEART RATE: 54 BPM | DIASTOLIC BLOOD PRESSURE: 87 MMHG

## 2022-12-14 PROCEDURE — 99213 OFFICE O/P EST LOW 20 MIN: CPT

## 2022-12-14 NOTE — HISTORY OF PRESENT ILLNESS
[de-identified] : 12/14/2022: Aneta is a 41-year-old female returns the office today for reassessment of her right shoulder pain.  The patient states that her symptoms are worse since her last appointment in the spring.  At her previous appointment we recommended a repeat MRI of the shoulder for possible presurgical planning purposes.  The patient was unable to obtain it.  She has been doing home exercises on her own.  She takes over-the-counter anti-inflammatories for pain.  She previously has had injections for the shoulder as well as physical therapy without any substantial improvement.  She is now interested in pursuing surgery.  The patient denies any fevers, chills, sweats, recent illnesses, numbness, tingling, weakness, or pain elsewhere at this time.\par \par 05/12/2022 : ANETA ANSARI  is a 41 year year old female who presents to the office for follow-up evaluation of her right shoulder pain.  She states she gets pain mostly over the anterior aspect of the right shoulder that is worse certain activities and movements and when sleeping and alleviated with rest.  She states she has taken meloxicam, done physical therapy and had an injection for the biceps and the calcium deposit of her shoulder all which have given short-term temporary relief.  She states she has been doing this for months without any significant long-term relief.  She is here for routine follow-up today.  She denies any numbness or tingling distally.  She has no other complaints today.\par \par 01/07/2022 : ANETA ANSARI  is a 41 year year old female who presents to the office for evaluation of her right shoulder.  She states she received an ultrasound-guided injection into the biceps back in September that gave relief for a couple months.  She states the procedure was uncomfortable however the injection did give her relief for a couple months.  She states however she still has pain over the anterior aspect of the shoulder that is worse with certain movements and alleviated with rest.  She states it can be sharp at times and radiates down the arm.  She states he is unable to lift heavy things because of the pain in the anterior aspect of the shoulder.  She states she has not done physical therapy because of lack of approval from her insurance company.  She denies any acute injury.  She denies any numbness or tingling distally.  She has no other complaints today.  She is been working light duty.\par \par 8/5/21: Aneta is a pleasant 40-year-old right-hand-dominant female returns the office today for follow-up regarding her right shoulder pain secondary to calcific tendinitis.  Patient states her symptoms are unchanged since her previous visit and she continues to have pain in the anterior shoulder which wakes her up from sleep at night.  She recently had an MRI and comes in to discuss those results with me today and any further recommendations.  The patient denies any fevers, chills, sweats, recent illnesses, numbness, tingling, weakness, or pain elsewhere at this time.

## 2022-12-14 NOTE — REASON FOR VISIT
[Follow-Up Visit] : a follow-up visit for [Other: ____] : [unfilled] [FreeTextEntry2] : right shoulder pain

## 2022-12-14 NOTE — PHYSICAL EXAM
[de-identified] : General:\par Awake, alert, no acute distress, Patient was cooperative and appropriate during the examination.\par \par The patient is of normal weight for height and age.\par \par Walks without an antalgic gait.\par \par Full, painless range of motion of the neck and back.\par \par Exam of the bilateral lower extremities is intact and symmetric with regards to dermatologic, vascular, and neurologic exam. Bilateral lower extremity sensation is grossly intact to light touch in the DP/SP/T/S/S nerve distributions. Intact DF/PF/EHL. BIlateral lower extremities warm and well-perfused with brisk capillary refill.\par \par \par Pulmonary:\par Regular, nonlabored breathing\par \par Abdomen:\par Soft, nontender, nondistended.\par \par Lymphatic:\par No evidence of axillary lymphadenopathy\par \par Right shoulder Exam:\par Physical exam of the shoulder demonstrates normal skin without signs of skin changes or abnormalities. No erythema, warmth, or joint effusion appreciated.  \par \par Sensation intact light touch C5-T1\par Palpable radial pulse\par Radial/ulnar/median/axillary/musculocutaneous/AIN/PIN nerves grossly intact\par \par Range of motion:\par Forward Flexion: 175\par Abduction: 150\par External Rotation: 45\par Internal Rotation: T12\par \par Palpation:\par Not tender to palpation over the glenohumeral joint\par Mildly tender palpation over the rotator cuff insertion on the greater tuberosity\par Not tender to palpation over the AC joint\par Moderately tender to palpation of the biceps tendon/bicipital groove\par \par Strength testing:\par Supraspinatus: 4+/5 limited by pain\par Infraspinatus: 5/5\par Subscapularis: 5/5\par \par Special test:\par Empty can test mildly positive\par Cleaning impingement test mildly positive\par Speeds test positive\par Norfolk's test negative\par Lift-off test negative\par Bell-press test negative\par Cross-arm adduction test negative\par \par  [de-identified] : MRI of the right shoulder from a [facility] imaging facility performed on 7/29/2021 was reviewed the patient today in the office.  There is no rotator cuff tear or other rotator cuff abnormality.  No focal pathology is identified in the right shoulder.

## 2022-12-14 NOTE — DISCUSSION/SUMMARY
[de-identified] : Assessment: 41-year-old female with right shoulder pain secondary to rotator cuff and biceps tendinitis\par \par Plan: I had a long discussion with the patient today regarding the nature of their diagnosis and treatment plan. We discussed the risks and benefits of no treatment as well as nonoperative and operative treatments.  I reviewed the patient's MRI from July 2021 today with her in the office which is negative for any rotator cuff tearing and does not demonstrate any identifiable residual calcium deposits.  On examination today she has tenderness anteriorly over the biceps as well as positive impingement signs.  She has mild difficulty with resisted forward elevation with pain.  The patient has exhausted conservative management in the form of anti-inflammatory medicines, physical therapy, activity modifications, and cortisone injections without any lasting relief.  This point she would like to consider possible surgical intervention.  At this point I recommend repeat MRI of the right shoulder to assess for any rotator cuff or biceps pathology.  Recommend follow-up after MRI to discuss results in person and possible surgical discussion.  The patient will remain on light duty at work at the [employer] office pending the MRI review.\par \par The patient verbalizes their understanding and agrees with the plan.  All questions were answered to their satisfaction.

## 2023-01-11 ENCOUNTER — OUTPATIENT (OUTPATIENT)
Dept: OUTPATIENT SERVICES | Facility: HOSPITAL | Age: 43
LOS: 1 days | End: 2023-01-11

## 2023-01-11 ENCOUNTER — APPOINTMENT (OUTPATIENT)
Dept: MRI IMAGING | Facility: CLINIC | Age: 43
End: 2023-01-11
Payer: COMMERCIAL

## 2023-01-11 DIAGNOSIS — M25.511 PAIN IN RIGHT SHOULDER: ICD-10-CM

## 2023-01-11 PROCEDURE — 73221 MRI JOINT UPR EXTREM W/O DYE: CPT | Mod: 26,RT

## 2023-02-08 ENCOUNTER — APPOINTMENT (OUTPATIENT)
Dept: ORTHOPEDIC SURGERY | Facility: CLINIC | Age: 43
End: 2023-02-08

## 2023-02-13 ENCOUNTER — APPOINTMENT (OUTPATIENT)
Dept: ORTHOPEDIC SURGERY | Facility: CLINIC | Age: 43
End: 2023-02-13
Payer: COMMERCIAL

## 2023-02-13 VITALS — BODY MASS INDEX: 25.76 KG/M2 | WEIGHT: 140 LBS | HEIGHT: 62 IN

## 2023-02-13 PROCEDURE — 99204 OFFICE O/P NEW MOD 45 MIN: CPT

## 2023-02-13 PROCEDURE — 73030 X-RAY EXAM OF SHOULDER: CPT | Mod: RT

## 2023-02-13 PROCEDURE — 73010 X-RAY EXAM OF SHOULDER BLADE: CPT | Mod: RT

## 2023-02-13 RX ORDER — METHYLPREDNISOLONE 4 MG/1
4 TABLET ORAL
Qty: 1 | Refills: 0 | Status: ACTIVE | COMMUNITY
Start: 2023-02-13 | End: 1900-01-01

## 2023-02-13 NOTE — IMAGING
[Right] : right shoulder [FreeTextEntry1] : The joints are OK.  There is a small lateral calcium. [FreeTextEntry5] : There is a Type I-II acromion.

## 2023-02-13 NOTE — REASON FOR VISIT
[FreeTextEntry2] : This is a 42 year old RHD F post  with right shoulder pain since January 2020.  There was no injury or prior history.  She has treated with Dr. Byrnes for calcific tendonitis, including PT, HEP, medications, and injections.  Surgery was never formally discussed.  Reaching and lifting are affected.  No numbness.  Her most recent MRI is from January 2023.  It can wake her at night.  Advil doesn't help much.

## 2023-02-13 NOTE — DATA REVIEWED
[FreeTextEntry1] : MRI R SHOULDER 1/11/23 (carestream):\par There is a small residual calcium anteriorly in the supraspinatus with partial tearing. The biceps and subscapularis are ok. There are AC changes. The muscle is good.

## 2023-02-13 NOTE — ASSESSMENT
[FreeTextEntry1] : We reviewed the findings and her options.\par Her history was discussed.\par While there are surgical options, we are going to try a nonoperative approach.\par PT is prescribed.\par Sleeper stretch demonstrated. \par MDP is prescribed. \par Questions answered. \par \par Patient seen by Dr. Jacoby Vieyra.\par Patient seen by Chetna DU under the supervision of Dr. Jacoby Vieyra.\par Entered by Chetna DU acting as a scribe for Dr. Jacoby Vieyra.\par Progress note completed by Chetna DU.\par Entered by Abida Farrar acting as scribe.

## 2023-02-13 NOTE — PHYSICAL EXAM
[Right] : right shoulder [Mild] : mild [5 ___] : forward flexion 5[unfilled]/5 [5___] : external rotation 5[unfilled]/5 [] : no sensory deficits [Left] : left shoulder [Sitting] : sitting [FreeTextEntry9] : IR to T10. [TWNoteComboBox6] : internal rotation L3 [TWNoteComboBox5] : internal rotation at 90 degrees of abduction 30 degrees [TWNoteComboBox4] : passive forward flexion 175 degrees [de-identified] : external rotation 90 degrees

## 2023-02-13 NOTE — HISTORY OF PRESENT ILLNESS
[9] : 9 [0] : 0 [Sleep] : sleep [Rest] : rest [Meds] : meds [] : no [FreeTextEntry1] : right shoulder  [FreeTextEntry5] : pt has been having right shoulder pain for about 2 years now [de-identified] :   [de-identified] : movement  [de-identified] : about 2 years ago [de-identified] : MRI

## 2023-03-02 ENCOUNTER — APPOINTMENT (OUTPATIENT)
Dept: ORTHOPEDIC SURGERY | Facility: CLINIC | Age: 43
End: 2023-03-02
Payer: COMMERCIAL

## 2023-03-02 VITALS
HEIGHT: 62 IN | BODY MASS INDEX: 25.76 KG/M2 | DIASTOLIC BLOOD PRESSURE: 86 MMHG | WEIGHT: 140 LBS | SYSTOLIC BLOOD PRESSURE: 133 MMHG | HEART RATE: 71 BPM

## 2023-03-02 DIAGNOSIS — M25.511 PAIN IN RIGHT SHOULDER: ICD-10-CM

## 2023-03-02 PROCEDURE — 99213 OFFICE O/P EST LOW 20 MIN: CPT | Mod: 25

## 2023-03-02 PROCEDURE — 20610 DRAIN/INJ JOINT/BURSA W/O US: CPT | Mod: RT

## 2023-03-02 NOTE — HISTORY OF PRESENT ILLNESS
[de-identified] : 03/02/2023 : ANETA ANSARI  is a 42 year  old female who presents to the office for follow-up evaluation of her right shoulder pain.  She states that since February 2021 she has been dealing with right shoulder pain on and off that waxes and wanes in severity and is  localized over the lateral aspect of the shoulder and radiates down the arm is worse certain activities and motions and worse with overhead activities and alleviated with rest.  She states the pain can be sharp and interferes with her activity and sleep.  She states she has taken anti-inflammatories and steroid packs and had 2 prior cortisone injections and done several weeks of physical therapy in the past without any significant relief.  She states the 2 prior injections gave her approximately a month or so of relief.  She states she has not any physical therapy recently and recently saw Dr. Vieyra who recommended continuation of nonsurgical treatment and gave her another Medrol Dosepak.  She had a new MRI completed and is here to discuss the results today.  She denies any numbness or tingling distally.  She denies any new or acute injury.  She has no other complaints today.\par \par 12/14/2022: Aneta is a 41-year-old female returns the office today for reassessment of her right shoulder pain.  The patient states that her symptoms are worse since her last appointment in the spring.  At her previous appointment we recommended a repeat MRI of the shoulder for possible presurgical planning purposes.  The patient was unable to obtain it.  She has been doing home exercises on her own.  She takes over-the-counter anti-inflammatories for pain.  She previously has had injections for the shoulder as well as physical therapy without any substantial improvement.  She is now interested in pursuing surgery.  The patient denies any fevers, chills, sweats, recent illnesses, numbness, tingling, weakness, or pain elsewhere at this time.\par \par 05/12/2022 : ANETA ANSARI  is a 41 year year old female who presents to the office for follow-up evaluation of her right shoulder pain.  She states she gets pain mostly over the anterior aspect of the right shoulder that is worse certain activities and movements and when sleeping and alleviated with rest.  She states she has taken meloxicam, done physical therapy and had an injection for the biceps and the calcium deposit of her shoulder all which have given short-term temporary relief.  She states she has been doing this for months without any significant long-term relief.  She is here for routine follow-up today.  She denies any numbness or tingling distally.  She has no other complaints today.\par \par 01/07/2022 : ANETA ANSARI  is a 41 year year old female who presents to the office for evaluation of her right shoulder.  She states she received an ultrasound-guided injection into the biceps back in September that gave relief for a couple months.  She states the procedure was uncomfortable however the injection did give her relief for a couple months.  She states however she still has pain over the anterior aspect of the shoulder that is worse with certain movements and alleviated with rest.  She states it can be sharp at times and radiates down the arm.  She states he is unable to lift heavy things because of the pain in the anterior aspect of the shoulder.  She states she has not done physical therapy because of lack of approval from her insurance company.  She denies any acute injury.  She denies any numbness or tingling distally.  She has no other complaints today.  She is been working light duty.\par \par 8/5/21: Aneta is a pleasant 40-year-old right-hand-dominant female returns the office today for follow-up regarding her right shoulder pain secondary to calcific tendinitis.  Patient states her symptoms are unchanged since her previous visit and she continues to have pain in the anterior shoulder which wakes her up from sleep at night.  She recently had an MRI and comes in to discuss those results with me today and any further recommendations.  The patient denies any fevers, chills, sweats, recent illnesses, numbness, tingling, weakness, or pain elsewhere at this time.

## 2023-03-02 NOTE — PROCEDURE
[de-identified] : I injected the patient's right shoulder today with cortisone.\par  \par I discussed at length with the patient the planned steroid and lidocaine injection. The risks, benefits, convalescence and alternatives were reviewed. The possible side effects discussed included but were not limited to: pain, swelling, heat, bleeding, and redness. Symptoms are generally mild but if they are extensive then contact the office. Giving pain relievers by mouth such as NSAIDs or Tylenol can generally treat the reactions to steroid and lidocaine. Rare cases of infection have been noted. Rash, hives and itching may occur post injection. If you have muscle pain or cramps, flushing and or swelling of the face, rapid heart beat, nausea, dizziness, fever, chills, headache, difficulty breathing, swelling in the arms or legs, or have a prickly feeling of your skin, contact a health care provider immediately. Following this discussion, the shoulder was prepped with Chlorhexidine and Alcohol and under sterile conditions the 80 mg Depo-Medrol and 4 cc Lidocaine injection was performed with a 22 gauge needle through a posterolateral injection site. The needle was introduced into the subacromial space and the medication was injected. Upon withdrawal of the needle the site was cleaned with alcohol and a band aid was applied. The patient tolerated the injection well and there were no adverse effects. Post injection instructions included no strenuous activity for 24 hours, cryotherapy and if there are any adverse effects to contact the office.

## 2023-03-02 NOTE — DISCUSSION/SUMMARY
[de-identified] : Assessment: 42-year-old female with right shoulder pain secondary to rotator cuff low-grade partial-thickness tear, tendinitis and mild AC joint arthrosis\par \par Plan: I had a long discussion with the patient today regarding the nature of their diagnosis and treatment plan. We discussed the risks and benefits of no treatment as well as nonoperative and operative treatments.  I reviewed the patient's MRI from July 2021 today with her in the office which is negative for any rotator cuff tearing and does not demonstrate any identifiable residual calcium deposits.  I reviewed the MRI of the right shoulder from January 2023 that revealed undersurface fraying of the distal infraspinatus without discrete tear and mild AC joint arthrosis.  On examination today she has extreme pain throughout range of motion and difficulty with exertion due to the pain in the shoulder.  Due to this and the MRI results I am recommending continued conservative treatment occluding ice, heat, rest, Mobic 15 mg once daily with food for pain and inflammation, physical therapy for symptomatic relief.  I emphasized the importance of physical therapy both formally and on her own to help with motion, function, pain and emphasized the importance of going 2-3 times a week consistently for symptomatic relief.  She was given a prescription today and GI precautions were discussed today.  I am also recommending a third and final cortisone injection be administered in the office today.  She tolerated the procedure well with no adverse effects.  She will follow-up in 6 to 8 weeks for repeat evaluation.  If symptoms were to persist despite conservative treatment we did discuss arthroscopy.  Patient discussed and reviewed with Dr. Byrnes today.\par \par The patient verbalizes their understanding and agrees with the plan.  All questions were answered to their satisfaction.

## 2023-03-02 NOTE — PHYSICAL EXAM
[de-identified] : General:\par Awake, alert, no acute distress, Patient was cooperative and appropriate during the examination.\par \par The patient is of normal weight for height and age.\par \par Walks without an antalgic gait.\par \par Full, painless range of motion of the neck and back.\par \par Exam of the bilateral lower extremities is intact and symmetric with regards to dermatologic, vascular, and neurologic exam. Bilateral lower extremity sensation is grossly intact to light touch in the DP/SP/T/S/S nerve distributions. Intact DF/PF/EHL. BIlateral lower extremities warm and well-perfused with brisk capillary refill.\par \par \par Pulmonary:\par Regular, nonlabored breathing\par \par Abdomen:\par Soft, nontender, nondistended.\par \par Lymphatic:\par No evidence of axillary lymphadenopathy\par \par Right shoulder Exam:\par Physical exam of the shoulder demonstrates normal skin without signs of skin changes or abnormalities. No erythema, warmth, or joint effusion appreciated.  \par \par Sensation intact light touch C5-T1\par Palpable radial pulse\par Radial/ulnar/median/axillary/musculocutaneous/AIN/PIN nerves grossly intact\par \par Range of motion:\par Forward Flexion: 150\par Abduction: 120\par External Rotation: 60\par Internal Rotation: T12\par \par Palpation:\par Not tender to palpation over the glenohumeral joint\par Moderately tender palpation over the rotator cuff insertion on the greater tuberosity\par Not tender to palpation over the AC joint\par Moderately tender to palpation of the biceps tendon/bicipital groove\par \par Strength testing:\par Supraspinatus: 4+/5 limited by pain\par Infraspinatus: 5-/5 limited by pain\par Subscapularis: 5/5\par \par Special test:\par Empty can test mildly positive\par Cleaning impingement test mildly positive\par Speeds test positive\par Conway's test negative\par Lift-off test negative\par Bell-press test negative\par Cross-arm adduction test negative\par \par  [de-identified] : MRI of the right shoulder taken at a [facility] imaging facility on January 11, 2023 revealed minimal supraspinatus and infraspinatus tendinosis with undersurface fraying of the distal aspect of the infraspinatus with mild AC joint arthrosis\par \par MRI of the right shoulder from a [facility] imaging facility performed on 7/29/2021 was reviewed the patient today in the office.  There is no rotator cuff tear or other rotator cuff abnormality.  No focal pathology is identified in the right shoulder.

## 2023-03-22 ENCOUNTER — APPOINTMENT (OUTPATIENT)
Dept: ORTHOPEDIC SURGERY | Facility: CLINIC | Age: 43
End: 2023-03-22
Payer: COMMERCIAL

## 2023-03-22 VITALS — HEIGHT: 62 IN | BODY MASS INDEX: 25.76 KG/M2 | WEIGHT: 140 LBS

## 2023-03-22 PROCEDURE — 99214 OFFICE O/P EST MOD 30 MIN: CPT

## 2023-03-22 RX ORDER — DICLOFENAC SODIUM AND MISOPROSTOL 75; 200 MG/1; UG/1
75-0.2 TABLET, DELAYED RELEASE ORAL
Qty: 60 | Refills: 0 | Status: ACTIVE | COMMUNITY
Start: 2023-03-22 | End: 1900-01-01

## 2023-03-22 NOTE — PHYSICAL EXAM
[Right] : right shoulder [Mild] : mild [5 ___] : forward flexion 5[unfilled]/5 [5___] : external rotation 5[unfilled]/5 [Left] : left shoulder [Sitting] : sitting [] : no sensory deficits [FreeTextEntry9] : IR to T10. [TWNoteComboBox6] : internal rotation L3 [TWNoteComboBox5] : internal rotation at 90 degrees of abduction 60 degrees [TWNoteComboBox4] : passive forward flexion 175 degrees [de-identified] : external rotation 90 degrees

## 2023-03-22 NOTE — REASON FOR VISIT
[FreeTextEntry2] : This is a 42 year old RHD F post  with right shoulder pain since January 2020.  There was no injury or prior history.  She has treated with Dr. Byrnes for calcific tendonitis, including PT, HEP, medications, and injections.  Reaching and lifting are affected.  No numbness.  Her most recent MRI is from January 2023.  It can wake her at night.  Advil doesn't help much.  She completed the MDP and stretching.  She missed some PT secondary to travel.  Overall, she feels unchanged.  She has more pain at rest now.  She followed up with Dr. Byrnes and had an injection on 3/2/23.  This hasn't helped.  She has had several injections all without long lasting relief.

## 2023-03-22 NOTE — DATA REVIEWED
[FreeTextEntry1] : [facility] There is a small residual calcium anteriorly in the supraspinatus with partial tearing. The biceps and subscapularis are ok. There are AC changes. The muscle is good.

## 2023-03-22 NOTE — HISTORY OF PRESENT ILLNESS
[9] : 9 [0] : 0 [Stabbing] : stabbing [Intermittent] : intermittent [Sleep] : sleep [Meds] : meds [Lying in bed] : lying in bed [Not working due to injury] : Work status: not working due to injury [de-identified] : Patient is here for a follow up on his right shoulder. Pain has improved. [] : no [FreeTextEntry1] : Right shoulder [de-identified] : Physical therapy 2 x a week, HEP.

## 2023-03-22 NOTE — ASSESSMENT
[FreeTextEntry1] : We discussed her course. \par She has tried nonsurgical measures a number of times.\par She has persistent pain.\par She is a candidate for surgery.\par She has some concerns about that. \par NSAIDs outlined.\par Arthrotec prescribed. \par PT prescribed.\par A repeat barbotage procedure could be considered, but this may not offer the results she want.\par She may ultimately come to surgery.\par XRs planned with follow up.\par Questions answered. \par Follow up with family advised if she wants to give surgery further consideration. \par \par Patient seen by Dr. Jacoby Vieyra.\par Patient seen by Chetna DU under the supervision of Dr. Jacoby Vieyra.\par Entered by Chetna DU acting as a scribe for Dr. Jacoby Vieyra.\par Progress note completed by Chetna DU.\par Entered by Abida Farrar acting as scribe.

## 2023-04-27 ENCOUNTER — APPOINTMENT (OUTPATIENT)
Dept: ORTHOPEDIC SURGERY | Facility: CLINIC | Age: 43
End: 2023-04-27

## 2023-04-28 ENCOUNTER — APPOINTMENT (OUTPATIENT)
Dept: ORTHOPEDIC SURGERY | Facility: CLINIC | Age: 43
End: 2023-04-28
Payer: COMMERCIAL

## 2023-04-28 VITALS — BODY MASS INDEX: 25.12 KG/M2 | HEIGHT: 62.5 IN | WEIGHT: 140 LBS

## 2023-04-28 PROCEDURE — 73010 X-RAY EXAM OF SHOULDER BLADE: CPT | Mod: RT

## 2023-04-28 PROCEDURE — 73030 X-RAY EXAM OF SHOULDER: CPT | Mod: RT

## 2023-04-28 PROCEDURE — 99214 OFFICE O/P EST MOD 30 MIN: CPT

## 2023-04-28 NOTE — DATA REVIEWED
[FreeTextEntry1] : [facility] There is a small residual calcium anteriorly in the supraspinatus with partial tearing. The biceps and subscapularis are ok. There are AC changes. The muscle is good.

## 2023-04-28 NOTE — HISTORY OF PRESENT ILLNESS
[7] : 7 [0] : 0 [de-identified] : pt is here today for a follow up for her right shoulder. pt states her pain is similar to last visit  [FreeTextEntry1] : right shoulder  [de-identified] : home exercises

## 2023-04-28 NOTE — PHYSICAL EXAM
[Right] : right shoulder [Mild] : mild [5 ___] : forward flexion 5[unfilled]/5 [5___] : external rotation 5[unfilled]/5 [Left] : left shoulder [Sitting] : sitting [] : no sensory deficits [FreeTextEntry9] : IR to T10. [TWNoteComboBox6] : internal rotation sacrum [TWNoteComboBox5] : internal rotation at 90 degrees of abduction 60 degrees [de-identified] : external rotation 90 degrees [TWNoteComboBox4] : passive forward flexion 175 degrees

## 2023-04-28 NOTE — IMAGING
[Right] : right shoulder [FreeTextEntry1] : The calcium is unchanged. The GH is ok.  [FreeTextEntry5] : There is Type I -II acromion.

## 2023-04-28 NOTE — ASSESSMENT
[FreeTextEntry1] : We discussed her course. \par She has tried nonsurgical measures a number of times.\par She was unable to attend PT due to financial cost. \par She may ultimately come to surgery, but I addressed my concerns regarding post operative PT. \par We discussed return to work and she will RTW 5/8/2023 FD.\par Questions answered. \par Follow up with family advised if she wants to give surgery further consideration. \par \par The documentation recorded by the scribe accurately reflects the service I personally performed and the decisions made by me.\par Entered by Primitivo Feng acting as scribe.\par \par Patient seen by Jacoby Vieyra M.D.\par Dr. Jacoby Vieyra\par Shoulder Surgery\par

## 2023-05-05 ENCOUNTER — APPOINTMENT (OUTPATIENT)
Dept: ORTHOPEDIC SURGERY | Facility: CLINIC | Age: 43
End: 2023-05-05
Payer: COMMERCIAL

## 2023-05-05 VITALS — HEIGHT: 62.5 IN | WEIGHT: 140 LBS | BODY MASS INDEX: 25.12 KG/M2

## 2023-05-05 DIAGNOSIS — M75.31 CALCIFIC TENDINITIS OF RIGHT SHOULDER: ICD-10-CM

## 2023-05-05 DIAGNOSIS — M25.811 OTHER SPECIFIED JOINT DISORDERS, RIGHT SHOULDER: ICD-10-CM

## 2023-05-05 DIAGNOSIS — M75.41 IMPINGEMENT SYNDROME OF RIGHT SHOULDER: ICD-10-CM

## 2023-05-05 PROCEDURE — 99214 OFFICE O/P EST MOD 30 MIN: CPT

## 2023-05-05 NOTE — PHYSICAL EXAM
[Right] : right shoulder [Mild] : mild [5 ___] : forward flexion 5[unfilled]/5 [5___] : external rotation 5[unfilled]/5 [Left] : left shoulder [Sitting] : sitting [] : no sensory deficits [FreeTextEntry9] : IR to T10. [TWNoteComboBox6] : internal rotation sacrum [TWNoteComboBox5] : internal rotation at 90 degrees of abduction 60 degrees [TWNoteComboBox4] : passive forward flexion 175 degrees [de-identified] : external rotation 90 degrees

## 2023-05-05 NOTE — REASON FOR VISIT
[FreeTextEntry2] : This is a 42 year old RHD F post  with right shoulder pain since January 2020.  There was no injury or prior history.  She has treated with Dr. Byrnes for calcific tendonitis, including PT, HEP, medications, and injections.  Reaching and lifting are affected.  No numbness.  Her most recent MRI is from January 2023.  It can wake her at night.  Advil doesn't help much.  She completed the MDP and stretching.  She missed some PT secondary to travel.  Overall, she feels unchanged.  She has more pain at rest now.  She followed up with Dr. Byrnes and had an injection on 3/2/23.  This hasn't helped.  She has had several injections all without long lasting relief.  She recently saw her employee doctor who offered work with restrictions, and has paperwork to fill out.

## 2023-05-05 NOTE — HISTORY OF PRESENT ILLNESS
[7] : 7 [0] : 0 [de-identified] : pt is here today for a follow up for her right shoulder. pt states her pain is similar  [FreeTextEntry1] : right shoulder  [de-identified] : home exercises

## 2023-05-05 NOTE — DATA REVIEWED
[FreeTextEntry1] : [facility] There is a small residual calcium anteriorly in the supraspinatus with partial tearing. The biceps and subscapularis are ok. There are AC changes. The muscle is good.

## 2023-05-05 NOTE — ASSESSMENT
[FreeTextEntry1] : We again discussed her course. \par She has tried nonsurgical measures a number of times.\par She was unable to attend PT due to financial cost. \par She may ultimately come to surgery, but I addressed my concerns regarding post operative PT. \par We discussed return to work and she will RTW 5/8/2023 FD with accommodations\par Questions answered. \par Follow up with family advised if she wants to give surgery further consideration. \par Documentation for work completed. \par I filled out her LD [employer] form.\par She will return in 2 months. \par \par The documentation recorded by the scribe accurately reflects the service I personally performed and the decisions made by me.\par Entered by Primitivo Feng acting as scribe.\par \par Patient seen by Jacoby Vieyra M.D.\par Dr. Jacoby Vieyra\par Shoulder Surgery\par

## 2023-05-30 ENCOUNTER — NON-APPOINTMENT (OUTPATIENT)
Age: 43
End: 2023-05-30

## 2023-07-07 ENCOUNTER — APPOINTMENT (OUTPATIENT)
Dept: ORTHOPEDIC SURGERY | Facility: CLINIC | Age: 43
End: 2023-07-07

## 2024-03-04 ENCOUNTER — EMERGENCY (EMERGENCY)
Facility: HOSPITAL | Age: 44
LOS: 1 days | Discharge: HOME | End: 2024-03-04
Attending: EMERGENCY MEDICINE
Payer: COMMERCIAL

## 2024-03-04 VITALS
SYSTOLIC BLOOD PRESSURE: 152 MMHG | DIASTOLIC BLOOD PRESSURE: 85 MMHG | OXYGEN SATURATION: 99 % | RESPIRATION RATE: 26 BRPM | HEIGHT: 65 IN | WEIGHT: 149.91 LBS | TEMPERATURE: 98 F | HEART RATE: 74 BPM

## 2024-03-04 VITALS
TEMPERATURE: 98 F | OXYGEN SATURATION: 100 % | SYSTOLIC BLOOD PRESSURE: 114 MMHG | RESPIRATION RATE: 16 BRPM | DIASTOLIC BLOOD PRESSURE: 68 MMHG | HEART RATE: 58 BPM

## 2024-03-04 LAB
ALBUMIN SERPL ELPH-MCNC: 4.3 G/DL — SIGNIFICANT CHANGE UP (ref 3.3–5.2)
ALP SERPL-CCNC: 40 U/L — SIGNIFICANT CHANGE UP (ref 40–120)
ALT FLD-CCNC: 26 U/L — SIGNIFICANT CHANGE UP
ANION GAP SERPL CALC-SCNC: 15 MMOL/L — SIGNIFICANT CHANGE UP (ref 5–17)
APPEARANCE UR: CLEAR — SIGNIFICANT CHANGE UP
AST SERPL-CCNC: 18 U/L — SIGNIFICANT CHANGE UP
BACTERIA # UR AUTO: NEGATIVE /HPF — SIGNIFICANT CHANGE UP
BASOPHILS # BLD AUTO: 0.03 K/UL — SIGNIFICANT CHANGE UP (ref 0–0.2)
BASOPHILS NFR BLD AUTO: 0.5 % — SIGNIFICANT CHANGE UP (ref 0–2)
BILIRUB SERPL-MCNC: 0.5 MG/DL — SIGNIFICANT CHANGE UP (ref 0.4–2)
BILIRUB UR-MCNC: NEGATIVE — SIGNIFICANT CHANGE UP
BUN SERPL-MCNC: 17 MG/DL — SIGNIFICANT CHANGE UP (ref 8–20)
CALCIUM SERPL-MCNC: 9.1 MG/DL — SIGNIFICANT CHANGE UP (ref 8.4–10.5)
CHLORIDE SERPL-SCNC: 101 MMOL/L — SIGNIFICANT CHANGE UP (ref 96–108)
CO2 SERPL-SCNC: 21 MMOL/L — LOW (ref 22–29)
COLOR SPEC: YELLOW — SIGNIFICANT CHANGE UP
CREAT SERPL-MCNC: 0.63 MG/DL — SIGNIFICANT CHANGE UP (ref 0.5–1.3)
DIFF PNL FLD: ABNORMAL
EGFR: 113 ML/MIN/1.73M2 — SIGNIFICANT CHANGE UP
EOSINOPHIL # BLD AUTO: 0.08 K/UL — SIGNIFICANT CHANGE UP (ref 0–0.5)
EOSINOPHIL NFR BLD AUTO: 1.3 % — SIGNIFICANT CHANGE UP (ref 0–6)
GLUCOSE SERPL-MCNC: 120 MG/DL — HIGH (ref 70–99)
GLUCOSE UR QL: NEGATIVE MG/DL — SIGNIFICANT CHANGE UP
HCG SERPL-ACNC: <4 MIU/ML — SIGNIFICANT CHANGE UP
HCT VFR BLD CALC: 36.8 % — SIGNIFICANT CHANGE UP (ref 34.5–45)
HGB BLD-MCNC: 13 G/DL — SIGNIFICANT CHANGE UP (ref 11.5–15.5)
IMM GRANULOCYTES NFR BLD AUTO: 0.2 % — SIGNIFICANT CHANGE UP (ref 0–0.9)
KETONES UR-MCNC: 15 MG/DL
LEUKOCYTE ESTERASE UR-ACNC: NEGATIVE — SIGNIFICANT CHANGE UP
LIDOCAIN IGE QN: 21 U/L — LOW (ref 22–51)
LYMPHOCYTES # BLD AUTO: 2.13 K/UL — SIGNIFICANT CHANGE UP (ref 1–3.3)
LYMPHOCYTES # BLD AUTO: 34.4 % — SIGNIFICANT CHANGE UP (ref 13–44)
MCHC RBC-ENTMCNC: 31 PG — SIGNIFICANT CHANGE UP (ref 27–34)
MCHC RBC-ENTMCNC: 35.3 GM/DL — SIGNIFICANT CHANGE UP (ref 32–36)
MCV RBC AUTO: 87.6 FL — SIGNIFICANT CHANGE UP (ref 80–100)
MONOCYTES # BLD AUTO: 0.45 K/UL — SIGNIFICANT CHANGE UP (ref 0–0.9)
MONOCYTES NFR BLD AUTO: 7.3 % — SIGNIFICANT CHANGE UP (ref 2–14)
NEUTROPHILS # BLD AUTO: 3.5 K/UL — SIGNIFICANT CHANGE UP (ref 1.8–7.4)
NEUTROPHILS NFR BLD AUTO: 56.3 % — SIGNIFICANT CHANGE UP (ref 43–77)
NITRITE UR-MCNC: NEGATIVE — SIGNIFICANT CHANGE UP
PH UR: 6 — SIGNIFICANT CHANGE UP (ref 5–8)
PLATELET # BLD AUTO: 317 K/UL — SIGNIFICANT CHANGE UP (ref 150–400)
POTASSIUM SERPL-MCNC: 3.8 MMOL/L — SIGNIFICANT CHANGE UP (ref 3.5–5.3)
POTASSIUM SERPL-SCNC: 3.8 MMOL/L — SIGNIFICANT CHANGE UP (ref 3.5–5.3)
PROT SERPL-MCNC: 7 G/DL — SIGNIFICANT CHANGE UP (ref 6.6–8.7)
PROT UR-MCNC: SIGNIFICANT CHANGE UP MG/DL
RBC # BLD: 4.2 M/UL — SIGNIFICANT CHANGE UP (ref 3.8–5.2)
RBC # FLD: 11.9 % — SIGNIFICANT CHANGE UP (ref 10.3–14.5)
RBC CASTS # UR COMP ASSIST: 5 /HPF — HIGH (ref 0–4)
SODIUM SERPL-SCNC: 137 MMOL/L — SIGNIFICANT CHANGE UP (ref 135–145)
SP GR SPEC: 1.02 — SIGNIFICANT CHANGE UP (ref 1–1.03)
SQUAMOUS # UR AUTO: 1 /HPF — SIGNIFICANT CHANGE UP (ref 0–5)
UROBILINOGEN FLD QL: 0.2 MG/DL — SIGNIFICANT CHANGE UP (ref 0.2–1)
WBC # BLD: 6.2 K/UL — SIGNIFICANT CHANGE UP (ref 3.8–10.5)
WBC # FLD AUTO: 6.2 K/UL — SIGNIFICANT CHANGE UP (ref 3.8–10.5)
WBC UR QL: 1 /HPF — SIGNIFICANT CHANGE UP (ref 0–5)

## 2024-03-04 PROCEDURE — 84702 CHORIONIC GONADOTROPIN TEST: CPT

## 2024-03-04 PROCEDURE — 85025 COMPLETE CBC W/AUTO DIFF WBC: CPT

## 2024-03-04 PROCEDURE — 96375 TX/PRO/DX INJ NEW DRUG ADDON: CPT

## 2024-03-04 PROCEDURE — 99285 EMERGENCY DEPT VISIT HI MDM: CPT

## 2024-03-04 PROCEDURE — 87086 URINE CULTURE/COLONY COUNT: CPT

## 2024-03-04 PROCEDURE — 74176 CT ABD & PELVIS W/O CONTRAST: CPT | Mod: MC

## 2024-03-04 PROCEDURE — 74176 CT ABD & PELVIS W/O CONTRAST: CPT | Mod: 26,MC

## 2024-03-04 PROCEDURE — 83690 ASSAY OF LIPASE: CPT

## 2024-03-04 PROCEDURE — 96374 THER/PROPH/DIAG INJ IV PUSH: CPT

## 2024-03-04 PROCEDURE — 80053 COMPREHEN METABOLIC PANEL: CPT

## 2024-03-04 PROCEDURE — 81001 URINALYSIS AUTO W/SCOPE: CPT

## 2024-03-04 PROCEDURE — 99284 EMERGENCY DEPT VISIT MOD MDM: CPT | Mod: 25

## 2024-03-04 PROCEDURE — 36415 COLL VENOUS BLD VENIPUNCTURE: CPT

## 2024-03-04 PROCEDURE — 96376 TX/PRO/DX INJ SAME DRUG ADON: CPT

## 2024-03-04 RX ORDER — KETOROLAC TROMETHAMINE 30 MG/ML
15 SYRINGE (ML) INJECTION ONCE
Refills: 0 | Status: DISCONTINUED | OUTPATIENT
Start: 2024-03-04 | End: 2024-03-04

## 2024-03-04 RX ORDER — ONDANSETRON 8 MG/1
4 TABLET, FILM COATED ORAL ONCE
Refills: 0 | Status: COMPLETED | OUTPATIENT
Start: 2024-03-04 | End: 2024-03-04

## 2024-03-04 RX ORDER — MORPHINE SULFATE 50 MG/1
4 CAPSULE, EXTENDED RELEASE ORAL ONCE
Refills: 0 | Status: DISCONTINUED | OUTPATIENT
Start: 2024-03-04 | End: 2024-03-04

## 2024-03-04 RX ORDER — TAMSULOSIN HYDROCHLORIDE 0.4 MG/1
0.4 CAPSULE ORAL ONCE
Refills: 0 | Status: COMPLETED | OUTPATIENT
Start: 2024-03-04 | End: 2024-03-04

## 2024-03-04 RX ORDER — IBUPROFEN 200 MG
1 TABLET ORAL
Qty: 28 | Refills: 0
Start: 2024-03-04 | End: 2024-03-10

## 2024-03-04 RX ORDER — SODIUM CHLORIDE 9 MG/ML
1000 INJECTION INTRAMUSCULAR; INTRAVENOUS; SUBCUTANEOUS ONCE
Refills: 0 | Status: COMPLETED | OUTPATIENT
Start: 2024-03-04 | End: 2024-03-04

## 2024-03-04 RX ORDER — OXYCODONE AND ACETAMINOPHEN 5; 325 MG/1; MG/1
1 TABLET ORAL
Qty: 12 | Refills: 0
Start: 2024-03-04 | End: 2024-03-06

## 2024-03-04 RX ORDER — TAMSULOSIN HYDROCHLORIDE 0.4 MG/1
1 CAPSULE ORAL
Qty: 10 | Refills: 0
Start: 2024-03-04 | End: 2024-03-13

## 2024-03-04 RX ADMIN — MORPHINE SULFATE 4 MILLIGRAM(S): 50 CAPSULE, EXTENDED RELEASE ORAL at 06:46

## 2024-03-04 RX ADMIN — Medication 15 MILLIGRAM(S): at 08:25

## 2024-03-04 RX ADMIN — SODIUM CHLORIDE 1000 MILLILITER(S): 9 INJECTION INTRAMUSCULAR; INTRAVENOUS; SUBCUTANEOUS at 08:48

## 2024-03-04 RX ADMIN — SODIUM CHLORIDE 1000 MILLILITER(S): 9 INJECTION INTRAMUSCULAR; INTRAVENOUS; SUBCUTANEOUS at 06:47

## 2024-03-04 RX ADMIN — ONDANSETRON 4 MILLIGRAM(S): 8 TABLET, FILM COATED ORAL at 06:46

## 2024-03-04 RX ADMIN — TAMSULOSIN HYDROCHLORIDE 0.4 MILLIGRAM(S): 0.4 CAPSULE ORAL at 08:49

## 2024-03-04 RX ADMIN — Medication 15 MILLIGRAM(S): at 06:46

## 2024-03-04 NOTE — ED PROVIDER NOTE - OBJECTIVE STATEMENT
42 yo F presents to ER c/o right flank pain radiating to RLQ since 5am with nausea/vomiting. Denies fever, chills, diarrhea, hematuria. Admits to mild dysuria. Has hx back pain but states current sx are much different.

## 2024-03-04 NOTE — ED PROVIDER NOTE - CARE PROVIDER_API CALL
Rosemarie Sorensen  Urology  [address], [address]  [place] [address]  Phone: [phone]  Fax: [phone]  Follow Up Time:

## 2024-03-04 NOTE — ED ADULT TRIAGE NOTE - CHIEF COMPLAINT QUOTE
pts son arrived to Emergency Department state mother needed help getting out of car due to back pain pt ambulated from car to wheel chair with steady gait, wheeled to triage states she had back injury due to work accident which she receives injections for

## 2024-03-04 NOTE — ED PROVIDER NOTE - PATIENT PORTAL LINK FT
You can access the FollowMyHealth Patient Portal offered by [facility] by registering at the following website: [email or web address]. By joining [facility]’s FollowMyHealth portal, you will also be able to view your health information using other applications (apps) compatible with our system.

## 2024-03-04 NOTE — ED PROVIDER NOTE - NSFOLLOWUPINSTRUCTIONS_ED_ALL_ED_FT
- Return to the ED for any new or worsening symptoms.   - Take medications as directed  - Follow-up with urologist within 1 week for further evaluation    Kidney Stones    Kidney stones (urolithiasis) are crystal deposits that form inside your kidneys. Pain is caused by the stone moving through the urinary tract, causing spasms of the ureter. Drink enough water and fluids to keep your urine clear or pale yellow. This will help you to pass the stone or stone fragments. If provided a strainer, strain all urine and keep all particulate matter and stones for a follow up appointment with a urologist.    SEEK IMMEDIATE MEDICAL CARE IF YOU HAVE ANY OF THE FOLLOWING SYMPTOMS: pain not controlled with medication, fever/chills, worsening vomiting, inability to urinate, or dizziness/lightheadedness.

## 2024-03-04 NOTE — ED PROVIDER NOTE - PHYSICAL EXAMINATION
Gen: Moderate distress, crying  HEENT: Mucous membranes moist, pink conjunctivae, EOMI  CV: RRR, nl s1/s2.  Resp: CTAB, normal rate and effort  GI: Abdomen soft, mild right mid abd ttp.   : RCVAT  Neuro: A&O x 3, moving all 4 extremities  MSK: No spine or joint tenderness to palpation  Skin: No rashes. intact and perfused.

## 2024-03-04 NOTE — ED ADULT NURSE NOTE - OBJECTIVE STATEMENT
pt c/o right flank pain radiating to abdomen. pt states " it woke me up , I cannot walk or do anything its 10 out of 10". denies cp/sob v/d/fever/chills. no significant pmh. 20g right ac placed.

## 2024-03-04 NOTE — ED PROVIDER NOTE - CLINICAL SUMMARY MEDICAL DECISION MAKING FREE TEXT BOX
42 yo F presents to ER c/o right flank pain radiating to RLQ since 5am with nausea/vomiting. Afebrile, in moderate distress 2/2 pain, +RCVAT, right abd ttp. Possible kidney stone, will obtain labs, ua, CT give analgesia and reassess 44 yo F presents to ER c/o right flank pain radiating to RLQ since 5am with nausea/vomiting. Afebrile, in moderate distress 2/2 pain, +RCVAT, right abd ttp. Possible kidney stone, will obtain labs, ua, CT give analgesia and reassess  RUMA Rogers: received sign out pending CT scan. CT demonstrating 3mm right UVJ stone. pain controlled following toradol. pt tolerating PO intake in ED. 1st incidence of kidney stone, will give urology f/u. Labs unremarkable, vitals WNL, pt afebrile. provided rx for nsaids, percocet and flomax. return precautions discussed.

## 2024-03-04 NOTE — ED ADULT NURSE NOTE - NSFALLUNIVINTERV_ED_ALL_ED
Bed/Stretcher in lowest position, wheels locked, appropriate side rails in place/Call bell, personal items and telephone in reach/Instruct patient to call for assistance before getting out of bed/chair/stretcher/Non-slip footwear applied when patient is off stretcher/[place] to call system/Physically safe environment - no spills, clutter or unnecessary equipment/Purposeful proactive rounding/Room/bathroom lighting operational, light cord in reach

## 2024-03-04 NOTE — ED PROVIDER NOTE - ATTENDING APP SHARED VISIT CONTRIBUTION OF CARE
I performed a history and physical exam of the patient and discussed their management with the advanced care provider. I reviewed the advanced care provider's note and agree with the documented findings and plan of care. My medical decision making and objective findings are found below.     Pt with R flank pain, found to have R ureterolithiasis (3mm), no evidence of infection on UA. Pain controlled and tolerating PO. DC home with flomax, pain control, urology f/u.

## 2024-03-04 NOTE — ED ADULT NURSE REASSESSMENT NOTE - NS ED NURSE REASSESS COMMENT FT1
patient received from PM RN care assumed  patient received alert and oriented x4, breathing even and unlabored.  patient reports right flank pain that radiates to groin endorses partial pain relief post medication administration

## 2024-03-05 LAB
CULTURE RESULTS: SIGNIFICANT CHANGE UP
SPECIMEN SOURCE: SIGNIFICANT CHANGE UP

## 2025-04-28 ENCOUNTER — OUTPATIENT (OUTPATIENT)
Dept: OUTPATIENT SERVICES | Facility: HOSPITAL | Age: 45
LOS: 1 days | End: 2025-04-28
Payer: COMMERCIAL

## 2025-04-28 VITALS
SYSTOLIC BLOOD PRESSURE: 116 MMHG | WEIGHT: 154.1 LBS | HEIGHT: 62 IN | OXYGEN SATURATION: 97 % | RESPIRATION RATE: 16 BRPM | HEART RATE: 78 BPM | DIASTOLIC BLOOD PRESSURE: 58 MMHG | TEMPERATURE: 98 F

## 2025-04-28 DIAGNOSIS — Z01.818 ENCOUNTER FOR OTHER PREPROCEDURAL EXAMINATION: ICD-10-CM

## 2025-04-28 DIAGNOSIS — Z98.890 OTHER SPECIFIED POSTPROCEDURAL STATES: Chronic | ICD-10-CM

## 2025-04-28 DIAGNOSIS — N93.8 OTHER SPECIFIED ABNORMAL UTERINE AND VAGINAL BLEEDING: ICD-10-CM

## 2025-04-28 DIAGNOSIS — N93.0 POSTCOITAL AND CONTACT BLEEDING: ICD-10-CM

## 2025-04-28 DIAGNOSIS — N93.9 ABNORMAL UTERINE AND VAGINAL BLEEDING, UNSPECIFIED: ICD-10-CM

## 2025-04-28 LAB
HCG SERPL-ACNC: <1 MIU/ML — SIGNIFICANT CHANGE UP
HCT VFR BLD CALC: 37.6 % — SIGNIFICANT CHANGE UP (ref 34.5–45)
HGB BLD-MCNC: 13.2 G/DL — SIGNIFICANT CHANGE UP (ref 11.5–15.5)
MCHC RBC-ENTMCNC: 31.5 PG — SIGNIFICANT CHANGE UP (ref 27–34)
MCHC RBC-ENTMCNC: 35.1 G/DL — SIGNIFICANT CHANGE UP (ref 32–36)
MCV RBC AUTO: 89.7 FL — SIGNIFICANT CHANGE UP (ref 80–100)
NRBC BLD AUTO-RTO: 0 /100 WBCS — SIGNIFICANT CHANGE UP (ref 0–0)
PLATELET # BLD AUTO: 327 K/UL — SIGNIFICANT CHANGE UP (ref 150–400)
RBC # BLD: 4.19 M/UL — SIGNIFICANT CHANGE UP (ref 3.8–5.2)
RBC # FLD: 12.4 % — SIGNIFICANT CHANGE UP (ref 10.3–14.5)
WBC # BLD: 6.03 K/UL — SIGNIFICANT CHANGE UP (ref 3.8–10.5)
WBC # FLD AUTO: 6.03 K/UL — SIGNIFICANT CHANGE UP (ref 3.8–10.5)

## 2025-04-28 PROCEDURE — 84702 CHORIONIC GONADOTROPIN TEST: CPT

## 2025-04-28 PROCEDURE — G0463: CPT

## 2025-04-28 PROCEDURE — 85027 COMPLETE CBC AUTOMATED: CPT

## 2025-04-28 PROCEDURE — 86850 RBC ANTIBODY SCREEN: CPT

## 2025-04-28 PROCEDURE — 86901 BLOOD TYPING SEROLOGIC RH(D): CPT

## 2025-04-28 PROCEDURE — 36415 COLL VENOUS BLD VENIPUNCTURE: CPT

## 2025-04-28 PROCEDURE — 86900 BLOOD TYPING SEROLOGIC ABO: CPT

## 2025-04-28 NOTE — H&P PST ADULT - NS MD HP INPLANTS MED DEV
Medicare Wellness Visit  Plan for Preventive Care    A good way for you to stay healthy is to use preventive care.  Medicare covers many services that can help you stay healthy.* The goal of these services is to find any health problems as quickly as possible. Finding problems early can help make them easier to treat.  Your personal plan below lists the services you may need and when they are due.      Health Maintenance Summary     Breast Cancer Screening (Every 2 Years)  Overdue since 4/10/2014    Colorectal Cancer Screen- (Colonoscopy - Every 10 Years)  Overdue since 4/10/2022    Influenza Vaccine (1)  Overdue since 9/1/2023    Traditional Medicare- Medicare Wellness Visit (Yearly)  Due since 10/5/2023    DTaP/Tdap/Td Vaccine (3 - Td or Tdap)  Next due on 5/30/2033    Hepatitis C Screening   Completed    Osteoporosis Screening   Ordered on 5/30/2023    COVID-19 Vaccine   Completed    Shingles Vaccine   Completed    Pneumococcal Vaccine 65+   Completed    Meningococcal Vaccine   Aged Out    Hepatitis B Vaccine (For Physician/APC Discussion)   Aged Out    HPV Vaccine   Aged Out           Preventive Care for Women and Men    Heart Screenings (Cardiovascular):  · Blood tests are used to check your cholesterol, lipid and triglyceride levels. High levels can increase your risk for heart disease and stroke. High levels can be treated with medications, diet and exercise. Lowering your levels can help keep your heart and blood vessels healthy.  Your provider will order these tests if they are needed.    · An ultrasound is done to see if you have an abdominal aortic aneurysm (AAA).  This is an enlargement of one of the main blood vessels that delivers blood to the body.   In the [place], 9,000 deaths are caused by AAA.  You may not even know you have this problem and as many as 1 in 3 people will have a serious problem if it is not treated.  Early diagnosis allows for more effective treatment and cure.  If you have  a family history of AAA or are a male age 65-75 who has smoked, you are at higher risk of an AAA.  Your provider can order this test, if needed.    Colorectal Screening:  · There are many tests that are used to check for cancer of your colon and rectum. You and your provider should discuss what test is best for you and when to have it done.  Options include:  · Screening Colonoscopy: exam of the entire colon, seen through a flexible lighted tube.  · Flexible Sigmoidoscopy: exam of the last third (sigmoid portion) of the colon and rectum, seen through a flexible lighted tube.  · Cologuard DNA stool test: a sample of your stool is used to screen for cancer and unseen blood in your stool.  · Fecal Occult Blood Test: a sample of your stool is studied to find any unseen blood    Flu Shot:  · An immunization that helps to prevent influenza (the flu). You should get this every year. The best time to get the shot is in the fall.    Pneumococcal Shot:  • Vaccines help prevent pneumococcal disease, which is any type of illness caused by Streptococcus pneumoniae bacteria. There are two kinds of pneumococcal vaccines available in the [place]:   o Pneumococcal conjugate vaccines (PCV20 or Xbyrtlj29®)  o Pneumococcal polysaccharide vaccine (PPSV23 or Dauhwglnz86®)  · For those who have never received any pneumococcal conjugate vaccine, CDC recommends PVC20 for adults 65 years or older and adults 19 through 64 years old with certain medical conditions or risk factors.   · For those who have previously received PCV13, this should be followed by a dose of PPSV23.     Hepatitis B Shot:  · An immunization that helps to protect people from getting Hepatitis B. Hepatitis B is a virus that spreads through contact with infected blood or body fluids. Many people with the virus do not have symptoms.  The virus can lead to serious problems, such as liver disease. Some people are at higher risk than others. Your doctor will tell you if  you need this shot.     Diabetes Screening:  · A test to measure sugar (glucose) in your blood is called a fasting blood sugar. Fasting means you cannot have food or drink for at least 8 hours before the test. This test can detect diabetes long before you may notice symptoms.    Glaucoma Screening:  · Glaucoma screening is performed by your eye doctor. The test measures the fluid pressure inside your eyes to determine if you have glaucoma.     Hepatitis C Screening:  · A blood test to see if you have the hepatitis C virus.  Hepatitis C attacks the liver and is a major cause of chronic liver disease.  Medicare will cover a single screening for all adults born between 1945 & 1965, or high risk patients (people who have injected illegal drugs or people who have had blood transfusions).  High risk patients who continue to inject illegal drugs can be screened for Hepatitis C every year.    Smoking and Tobacco-Use Cessation Counseling:  · Tobacco is the single greatest cause of disease and early death in our country today. Medication and counseling together can increase a person’s chance of quitting for good.   · Medicare covers two quitting attempts per year, with four counseling sessions per attempt (eight sessions in a 12 month period)    Preventive Screening tests for Women    Screening Mammograms and Breast Exams:  · An x-ray of your breasts to check for breast cancer before you or your doctor may be able to feel it.  If breast cancer is found early it can usually be treated with success.    Pelvic Exams and Pap Tests:  · An exam to check for cervical and vaginal cancer. A Pap test is a lab test in which cells are taken from your cervix and sent to the lab to look for signs of cervical cancer. If cancer of the cervix is found early, chances for a cure are good. Testing can generally end at age 65, or if a woman has a hysterectomy for a benign condition. Your provider may recommend more frequent testing if certain  abnormal results are found.    Bone Mass Measurements:  · A painless x-ray of your bone density to see if you are at risk for a broken bone. Bone density refers to the thickness of bones or how tightly the bone tissue is packed.    Preventive Screening tests for Men    Prostate Screening:  · Should you have a prostate cancer test (PSA)?  It is up to you to decide if you want a prostate cancer test. Talk to your clinician to find out if the test is right for you.  Things for you to consider and talk about should include:  · Benefits and harms of the test  · Your family history  · How your race/ethnicity may influence the test  · If the test may impact other medical conditions you have  · Your values on screenings and treatments    *Medicare pays for many preventive services to keep you healthy. For some of these services, you might have to pay a deductible, coinsurance, and / or copayment.  The amounts vary depending on the type of services you need and the kind of Medicare health plan you have.    For further details on screenings offered by Medicare please visit: https://www.medicare.gov/coverage/preventive-screening-services    None

## 2025-04-28 NOTE — H&P PST ADULT - HISTORY OF PRESENT ILLNESS
45 yo female presents to PST scheduled for a D&C hysteroscopy on  with Dr. Linares. Reports H/O heavy menstrual bleeding for the past month. Has been getting her cycle every 2 weeks. Still spotting now. LMP started last week.  . C/O cramping.  43 yo female with Asthma,  presents to [facility] scheduled for a D&C hysteroscopy on  with Dr. Linares. Reports H/O heavy menstrual bleeding for the past month. Has been getting her cycle every 2 weeks. Still spotting now. LMP started last week.  . C/O cramping.

## 2025-04-28 NOTE — H&P PST ADULT - NSICDXFAMILYHX_GEN_ALL_CORE_FT
FAMILY HISTORY:  Father  Still living? No  FH: heart attack, Age at diagnosis: Age Unknown    Mother  Still living? Yes, Estimated age: Age Unknown  FH: HTN (hypertension), Age at diagnosis: Age Unknown

## 2025-04-28 NOTE — H&P PST ADULT - PROBLEM SELECTOR PLAN 2
Labs  - CBC, HCG and T&S  No MC needed or requested, pending review of PSTs   Pre op instructions reviewed and given. Inhaler as needed. No meds to take am of surgery. Instructed to hold and/or avoid other NSAIDs and OTC supplements. Tylenol is ok. Verbalized understanding

## 2025-05-08 NOTE — ASU PATIENT PROFILE, ADULT - NSICDXPASTMEDICALHX_GEN_ALL_CORE_FT
PAST MEDICAL HISTORY:  Asthma     Back pain     High cholesterol     Kidney stone 2024 ? passed    Other specified abnormal uterine and vaginal bleeding     Postcoital and contact bleeding

## 2025-05-09 ENCOUNTER — OUTPATIENT (OUTPATIENT)
Dept: OUTPATIENT SERVICES | Facility: HOSPITAL | Age: 45
LOS: 1 days | End: 2025-05-09
Payer: COMMERCIAL

## 2025-05-09 ENCOUNTER — TRANSCRIPTION ENCOUNTER (OUTPATIENT)
Age: 45
End: 2025-05-09

## 2025-05-09 VITALS
SYSTOLIC BLOOD PRESSURE: 135 MMHG | HEIGHT: 62 IN | DIASTOLIC BLOOD PRESSURE: 84 MMHG | HEART RATE: 53 BPM | OXYGEN SATURATION: 97 % | RESPIRATION RATE: 18 BRPM | WEIGHT: 154.1 LBS | TEMPERATURE: 99 F

## 2025-05-09 VITALS
OXYGEN SATURATION: 99 % | TEMPERATURE: 98 F | HEART RATE: 59 BPM | RESPIRATION RATE: 19 BRPM | SYSTOLIC BLOOD PRESSURE: 123 MMHG | DIASTOLIC BLOOD PRESSURE: 73 MMHG

## 2025-05-09 DIAGNOSIS — Z98.890 OTHER SPECIFIED POSTPROCEDURAL STATES: Chronic | ICD-10-CM

## 2025-05-09 DIAGNOSIS — N93.9 ABNORMAL UTERINE AND VAGINAL BLEEDING, UNSPECIFIED: ICD-10-CM

## 2025-05-09 DIAGNOSIS — N93.0 POSTCOITAL AND CONTACT BLEEDING: ICD-10-CM

## 2025-05-09 DIAGNOSIS — Z01.818 ENCOUNTER FOR OTHER PREPROCEDURAL EXAMINATION: ICD-10-CM

## 2025-05-09 LAB
ABO RH CONFIRMATION: SIGNIFICANT CHANGE UP
HCG UR QL: NEGATIVE — SIGNIFICANT CHANGE UP

## 2025-05-09 PROCEDURE — 81025 URINE PREGNANCY TEST: CPT

## 2025-05-09 PROCEDURE — 88342 IMHCHEM/IMCYTCHM 1ST ANTB: CPT

## 2025-05-09 PROCEDURE — 58558 HYSTEROSCOPY BIOPSY: CPT

## 2025-05-09 PROCEDURE — 36415 COLL VENOUS BLD VENIPUNCTURE: CPT

## 2025-05-09 PROCEDURE — 88342 IMHCHEM/IMCYTCHM 1ST ANTB: CPT | Mod: 26

## 2025-05-09 PROCEDURE — 88305 TISSUE EXAM BY PATHOLOGIST: CPT

## 2025-05-09 PROCEDURE — 88305 TISSUE EXAM BY PATHOLOGIST: CPT | Mod: 26

## 2025-05-09 RX ORDER — IBUPROFEN 200 MG
3 TABLET ORAL
Qty: 30 | Refills: 0
Start: 2025-05-09

## 2025-05-09 RX ORDER — SODIUM CHLORIDE 9 G/1000ML
1000 INJECTION, SOLUTION INTRAVENOUS
Refills: 0 | Status: DISCONTINUED | OUTPATIENT
Start: 2025-05-09 | End: 2025-05-09

## 2025-05-09 RX ORDER — ALBUTEROL SULFATE 2.5 MG/3ML
2 VIAL, NEBULIZER (ML) INHALATION
Refills: 0 | DISCHARGE

## 2025-05-09 RX ORDER — ACETAMINOPHEN 500 MG/5ML
2 LIQUID (ML) ORAL
Qty: 30 | Refills: 0
Start: 2025-05-09

## 2025-05-09 RX ORDER — CYCLOBENZAPRINE HYDROCHLORIDE 15 MG/1
1 CAPSULE, EXTENDED RELEASE ORAL
Refills: 0 | DISCHARGE

## 2025-05-09 RX ORDER — ONDANSETRON HCL/PF 4 MG/2 ML
4 VIAL (ML) INJECTION ONCE
Refills: 0 | Status: DISCONTINUED | OUTPATIENT
Start: 2025-05-09 | End: 2025-05-09

## 2025-05-09 RX ORDER — HYDROMORPHONE/SOD CHLOR,ISO/PF 2 MG/10 ML
0.5 SYRINGE (ML) INJECTION
Refills: 0 | Status: DISCONTINUED | OUTPATIENT
Start: 2025-05-09 | End: 2025-05-09

## 2025-05-09 RX ORDER — OXYCODONE HYDROCHLORIDE 30 MG/1
5 TABLET ORAL ONCE
Refills: 0 | Status: DISCONTINUED | OUTPATIENT
Start: 2025-05-09 | End: 2025-05-09

## 2025-05-09 RX ADMIN — SODIUM CHLORIDE 60 MILLILITER(S): 9 INJECTION, SOLUTION INTRAVENOUS at 07:09

## 2025-05-09 NOTE — ASU DISCHARGE PLAN (ADULT/PEDIATRIC) - ACTIVITY LEVEL
Pulm rehab is calling asking for a NEW order for pulm rehab with a DX of broniectasis... Please advise   
Nothing per vagina/No tub baths/No douching/No tampons/No intercourse

## 2025-05-09 NOTE — ASU DISCHARGE PLAN (ADULT/PEDIATRIC) - CARE PROVIDER_API CALL
Nancy Calderón  Obstetrics and Gynecology  [address], [address]  [place] [address]  Phone: [phone]  Fax: [phone]  Follow Up Time: 2 weeks

## 2025-05-09 NOTE — ASU DISCHARGE PLAN (ADULT/PEDIATRIC) - FINANCIAL ASSISTANCE
[facility] provides services at a reduced cost to those who are determined to be eligible through [facility]’s financial assistance program. Information regarding [facility]’s financial assistance program can be found by going to [email or web address] or by calling [phone].

## 2025-05-09 NOTE — BRIEF OPERATIVE NOTE - NSICDXBRIEFPROCEDURE_GEN_ALL_CORE_FT
PROCEDURES:  D&C (dilatation and curettage, scraping of uterus) 09-May-2025 08:38:18  Sharon Grullon  Hysteroscopy 09-May-2025 08:38:30  Sharon Grullon

## 2025-05-09 NOTE — ASU DISCHARGE PLAN (ADULT/PEDIATRIC) - PHYSICIAN SECTION COMPLETE
Brain MRI ordered in September was never done. Order being canceled. Needs to be seen again to reevaluate need for imaging.  
Yes

## 2025-05-13 ENCOUNTER — TRANSCRIPTION ENCOUNTER (OUTPATIENT)
Age: 45
End: 2025-05-13

## 2025-05-13 LAB — SURGICAL PATHOLOGY STUDY: SIGNIFICANT CHANGE UP

## (undated) DEVICE — PACK LITHOTOMY

## (undated) DEVICE — SOL IRR POUR H2O 1000ML

## (undated) DEVICE — GLV 6.5 PROTEXIS (WHITE)

## (undated) DEVICE — PREP TRAY DRY SKIN PREP SCRUB

## (undated) DEVICE — TUBING TUR 2 PRONG

## (undated) DEVICE — DRAPE LIGHT HANDLE COVER (GREEN)

## (undated) DEVICE — VACUUM CURETTE BERKLEY OLYMPUS CURVED 7MM

## (undated) DEVICE — VENODYNE/SCD SLEEVE CALF MEDIUM

## (undated) DEVICE — TUBING SUCTION 20FT

## (undated) DEVICE — SOL INJ LR 1000ML

## (undated) DEVICE — DRAPE TOWEL BLUE 17" X 24"

## (undated) DEVICE — DRAPE MAYO STAND 23"

## (undated) DEVICE — TUBING GYRUS ACMI COLLECTION SET 6FT

## (undated) DEVICE — VENODYNE/SCD SLEEVE CALF LARGE